# Patient Record
Sex: FEMALE | Race: OTHER | HISPANIC OR LATINO | Employment: PART TIME | URBAN - METROPOLITAN AREA
[De-identification: names, ages, dates, MRNs, and addresses within clinical notes are randomized per-mention and may not be internally consistent; named-entity substitution may affect disease eponyms.]

---

## 2018-04-08 ENCOUNTER — HOSPITAL ENCOUNTER (EMERGENCY)
Facility: HOSPITAL | Age: 23
Discharge: HOME | End: 2018-04-08
Admitting: EMERGENCY MEDICINE
Payer: COMMERCIAL

## 2018-04-08 VITALS
DIASTOLIC BLOOD PRESSURE: 65 MMHG | WEIGHT: 240.8 LBS | RESPIRATION RATE: 18 BRPM | SYSTOLIC BLOOD PRESSURE: 139 MMHG | TEMPERATURE: 98.6 F | OXYGEN SATURATION: 100 % | HEART RATE: 100 BPM

## 2018-04-08 DIAGNOSIS — H60.92 LEFT OTITIS EXTERNA: Primary | ICD-10-CM

## 2018-04-08 PROCEDURE — 99282 EMERGENCY DEPT VISIT SF MDM: CPT

## 2018-04-08 RX ORDER — ACETIC ACID 20.65 MG/ML
4 SOLUTION AURICULAR (OTIC) 3 TIMES DAILY
Qty: 15 ML | Refills: 0 | Status: SHIPPED | OUTPATIENT
Start: 2018-04-08 | End: 2019-09-24

## 2018-04-08 NOTE — ED PROVIDER NOTES
History  Chief Complaint   Patient presents with   [place] Spurr     Patient complains of L ear pain starting on Thursday  Denies fevers  Patient has Hx of swimmers ear and feels like same pain  Patient is a 26-year-old female who reports left ear feeling itchy and wet starting on Thursday  Since then developed left ear pain which has been steadily worsening  She has not tried anything for the pain as of yet  She does have a history of swimmer's ear and reports that symptoms are similar  She denies fevers, chills, runny nose, congestion, sore throat, cough  ROS:  Gen: no fevers, chills, fatigue  Eyes: no redness, pain, vision change, discharge  ENT: +left er pain, no runny nose, no congestion, no sinus pain, no sore throat  Heart: no chest pain, palpitations  Lungs: no cough, no chest tightness, no wheezing, no sob  GI: no abdominal pain, no nausea, no vomiting, no diarrhea  Gu: no dysuria  Skin: no rash  Neuro: no dizziness, headaches, weakness  Ms: no myalgias, no arthralgias     Physical:  Vitals reviewed  Gen: well nourished, cooperative, comfortable in no acute distress  Eyes: PERRL, EOM's intact, conjunctiva/sclera clear, no discharge  Ears: external ears normal, right EAC clear, right TM clear  Left pinna and tragus tender to palpation, left EAC hyperemic with mild edema and scaling, L TM clear  Nose: no active drainage, turbinates with no inflammation, sinuses NTTP  Mouth: mucous membranes moist, pos pharynx clear with no erythema, no edema, uvula is midline, no tonsillar hypertrophy, no exudates  Neck: supple, no masses, no lymphadenopathy, FROM intact  Heart: regular rate and rhythm, no murmurs  Lungs: clear bilat, no wheezes, rales, rhonchi, no tachypnea  Abd: soft, +BS, NTTP, no guarding, no rebound  Skin: good skin turgor, no rash  Musc: Non-focal with FROM BL upper/lower extremities, neck, chest and back  Neuro: Non-focal  No sensory deficits noted  No motor deficits noted  None       History reviewed  No pertinent past medical history  History reviewed  No pertinent surgical history  History reviewed  No pertinent family history  I have reviewed and agree with the history as documented  Social History   Substance Use Topics    Smoking status: Never Smoker    Smokeless tobacco: Never Used    Alcohol use No        Review of Systems    Physical Exam  ED Triage Vitals [04/08/18 1000]   Temperature Pulse Respirations Blood Pressure SpO2   98 6 °F (37 °C) 100 18 139/65 100 %      Temp Source Heart Rate Source Patient Position - Orthostatic VS BP Location FiO2 (%)   Oral Monitor Sitting Right arm --      Pain Score       8           Orthostatic Vital Signs  Vitals:    04/08/18 1000   BP: 139/65   Pulse: 100   Patient Position - Orthostatic VS: Sitting       Physical Exam    ED Medications  Medications - No data to display    Diagnostic Studies  Results Reviewed     None                 No orders to display              Procedures  Procedures       Phone Contacts  ED Phone Contact    ED Course  ED Course                                MDM  CritCare Time    Disposition  Final diagnoses:   Left otitis externa     Time reflects when diagnosis was documented in both MDM as applicable and the Disposition within this note     Time User Action Codes Description Comment    4/8/2018 10:13 AM Lili SCHWAB Add [H60 92] Left otitis externa       ED Disposition     ED Disposition Condition Comment    Discharge  [address] discharge to home/self care  Condition at discharge: Good        Follow-up Information     Follow up With Specialties Details Why Contact Info Additional Information    [facility] Urgent Care  For recheck in 5-7 days, sooner if symptoms change or worsen  214 Replaced by [facility]  [phone] Via the [address] [facility] (North[facility]) Take [address] toward [facility]   Take the [facility] [address]  [employer] [address]  Keep right and follow signs for [address]/ [place]  Merge onto [address] [place] [address]  In a half mile, take the exit for [address] toward [facility]  In 0 7 miles take the [facility] [employer]  Merge onto [facility]  In 500 feet, turn left on [address] and drive 0 3 miles  [address] will be on your left  [address] [place]  Take the [facility] [employer]  Merge onto [facility]  In 500 feet, turn left on [address] and [address] 0 [address] miles  [address] will be on your left  [address] [facility] towards [facility]  In 0 7 miles take the [facility] [employer]  Merge onto [facility]  In 500 feet, turn left on [address] and drive 0 3 miles  [address] will be on your left  Patient's Medications   Discharge Prescriptions    ACETIC ACID (VOSOL) 2 % OTIC SOLUTION    Administer 4 drops into the left ear 3 (three) times a day       Start Date: 4/8/2018  End Date: --       Order Dose: 4 drops       Quantity: 15 mL    Refills: 0     No discharge procedures on file      ED Provider  Electronically Signed by           Gabrielle Douglas PA-C  04/08/18 1016

## 2018-04-08 NOTE — DISCHARGE INSTRUCTIONS
Otitis Externa   WHAT YOU NEED TO KNOW:   Otitis externa, or swimmer's ear, is an infection in the outer ear canal  This canal goes from the outside of the ear to the eardrum  DISCHARGE INSTRUCTIONS:   Return to the emergency department if:   · You have severe ear pain  · You are suddenly unable to hear at all  · You have new swelling in your face, behind your ears, or in your neck  · You suddenly cannot move part of your face  · Your face suddenly feels numb  Contact your healthcare provider if:   · You have a fever  · Your signs and symptoms do not get better after 2 days of treatment  · Your signs and symptoms go away for a time, but then come back  · You have questions or concerns about your condition or care  Medicines:   · NSAIDs , such as ibuprofen, help decrease swelling, pain, and fever  This medicine is available with or without a doctor's order  NSAIDs can cause stomach bleeding or kidney problems in certain people  If you take blood thinner medicine, always ask if NSAIDs are safe for you  Always read the medicine label and follow directions  Do not give these medicines to children under 10months of age without direction from your child's healthcare provider  · Acetaminophen  decreases pain and fever  It is available without a doctor's order  Ask how much to take and how often to take it  Follow directions  Acetaminophen can cause liver damage if not taken correctly  · Ear drops  that contain an antibiotic may be given  The antibiotic helps treat a bacterial infection  You may also be given steroid medicine  The steroid helps decrease redness, swelling, and pain  · Take your medicine as directed  Contact your healthcare provider if you think your medicine is not helping or if you have side effects  Tell him or her if you are allergic to any medicine  Keep a list of the medicines, vitamins, and herbs you take   Include the amounts, and when and why you take them  Bring the list or the pill bottles to follow-up visits  Carry your medicine list with you in case of an emergency  Follow up with your healthcare provider as directed:  Write down your questions so you remember to ask them during your visits  How to use eardrops:   · Lie down on your side with your infected ear facing up  · Carefully drip the correct number of eardrops into your ear  Have another person help you if possible  · Gently move the outside part of your ear back and forth to help the medicine reach your ear canal      · Stay lying down in the same position (with your ear facing up) for 3 to 5 minutes  Prevent otitis externa:   · Do not put cotton swabs or foreign objects in your ears  · Wrap a clean moist washcloth around your finger, and use it to clean your outer ear and remove extra ear wax  · Use ear plugs when you swim  Dry your outer ears completely after you swim or bathe  © 2017 [facility] Information is for End User's use only and may not be sold, redistributed or otherwise used for commercial purposes  All illustrations and images included in CareNotes® are the copyrighted property of [employer]  or Jonathan Alexander  The above information is an  only  It is not intended as medical advice for individual conditions or treatments  Talk to your doctor, nurse or pharmacist before following any medical regimen to see if it is safe and effective for you

## 2018-04-08 NOTE — ED NOTES
Pt evaluated and discharged by provider prior to nursing assessment          Storm Barakat RN  04/08/18 1789

## 2018-04-12 ENCOUNTER — OFFICE VISIT (OUTPATIENT)
Dept: URGENT CARE | Facility: MEDICAL CENTER | Age: 23
End: 2018-04-12
Payer: COMMERCIAL

## 2018-04-12 VITALS
SYSTOLIC BLOOD PRESSURE: 110 MMHG | BODY MASS INDEX: 36.08 KG/M2 | DIASTOLIC BLOOD PRESSURE: 62 MMHG | TEMPERATURE: 97.3 F | WEIGHT: 243.6 LBS | HEIGHT: 69 IN | HEART RATE: 80 BPM | RESPIRATION RATE: 18 BRPM | OXYGEN SATURATION: 99 %

## 2018-04-12 DIAGNOSIS — H60.332 ACUTE SWIMMER'S EAR OF LEFT SIDE: Primary | ICD-10-CM

## 2018-04-12 PROCEDURE — G0382 LEV 3 HOSP TYPE B ED VISIT: HCPCS | Performed by: PHYSICIAN ASSISTANT

## 2018-04-12 PROCEDURE — 99283 EMERGENCY DEPT VISIT LOW MDM: CPT | Performed by: PHYSICIAN ASSISTANT

## 2018-04-12 NOTE — PROGRESS NOTES
[facility]        NAME: Ansel Cockayne is a 25 y o  female  : 1995    MRN: [id number]  DATE: 2018  TIME: 3:58 PM    Assessment and Plan   Acute swimmer's ear of left side [H60 332]  1  Acute swimmer's ear of left side           Patient Instructions       Continue taking ear drops as prescribed  Follow up with family doctor this week to confirm resolution  Go to ER if any new or worsening symptoms occur  Chief Complaint     Chief Complaint   Patient presents with    Ear Drainage      went to the hospital dx= otitis, given ear gtts then saw fmd tuesday, given ear gtts  pain is less today but hearing loss is worse  still using ear gtts  taking prn ibuprofen, LD 10:00 am          History of Present Illness       26 yo F was dx with otitis externa  and given acetic acid  She followed up with PCP on 4/10 and was given a different ear drop but patient doesn't know what that was  She has been taking that ear drop as prescribed and currently she has no pain  Patient states that she noticed white fluid in ear today so she wanted to be seen again  States symptoms appear to have completely resolved except for this white dc  No other sx        Review of Systems   Review of Systems   Constitutional: Negative for chills, diaphoresis, fatigue and fever  HENT: Positive for ear discharge  Negative for congestion, ear pain, postnasal drip, rhinorrhea, sinus pain, sinus pressure and voice change  Eyes: Negative  Respiratory: Negative for cough, chest tightness and shortness of breath  Cardiovascular: Negative for chest pain and palpitations  Gastrointestinal: Negative for constipation, diarrhea, nausea and vomiting  Endocrine: Negative  Genitourinary: Negative for dysuria  Musculoskeletal: Negative for back pain, myalgias and neck pain  Skin: Negative for pallor and rash  Allergic/Immunologic: Negative      Neurological: Negative for dizziness, syncope and headaches  Hematological: Negative  Psychiatric/Behavioral: Negative  Current Medications       Current Outpatient Prescriptions:     acetic acid (VOSOL) 2 % otic solution, Administer 4 drops into the left ear 3 (three) times a day, Disp: 15 mL, Rfl: 0    Current Allergies     Allergies as of 04/12/2018    (No Known Allergies)            The following portions of the patient's history were reviewed and updated as appropriate: allergies, current medications, past family history, past medical history, past social history, past surgical history and problem list      No past medical history on file  No past surgical history on file  No family history on file  Medications have been verified  Objective   /62 (BP Location: Left arm, Patient Position: Sitting)   Pulse 80   Temp (!) 97 3 °F (36 3 °C) (Tympanic)   Resp 18   Ht 5' 9" (1 753 m)   Wt 110 kg (243 lb 9 6 oz)   SpO2 99%   BMI 35 97 kg/m²        Physical Exam     Physical Exam   Constitutional: She is oriented to person, place, and time  She appears well-developed and well-nourished  No distress  HENT:   Head: Normocephalic and atraumatic  Right Ear: External ear normal    Left Ear: External ear normal    Nose: Nose normal    Mouth/Throat: Oropharynx is clear and moist  No oropharyngeal exudate  R ear completely normal   L ear no longer has pain with movement of tragus or pinna  TM intact and translucent  Large amount of white dc with mild edema of L ear canal consistent with otitis externa  Eyes: Conjunctivae and EOM are normal  Pupils are equal, round, and reactive to light  Neck: Normal range of motion  Neck supple  Cardiovascular: Normal rate, regular rhythm, normal heart sounds and intact distal pulses  Pulmonary/Chest: Effort normal and breath sounds normal  No respiratory distress  She has no wheezes  She has no rales  Musculoskeletal: She exhibits no edema or deformity     Neurological: She is alert and oriented to person, place, and time  Skin: Skin is warm  No rash noted  She is not diaphoretic  Nursing note and vitals reviewed

## 2018-04-12 NOTE — PATIENT INSTRUCTIONS
Continue taking ear drops as prescribed  Follow up with family doctor this week to confirm resolution  Go to ER if any new or worsening symptoms occur  Otitis Externa   WHAT YOU NEED TO KNOW:   Otitis externa, or swimmer's ear, is an infection in the outer ear canal  This canal goes from the outside of the ear to the eardrum  DISCHARGE INSTRUCTIONS:   Return to the emergency department if:   · You have severe ear pain  · You are suddenly unable to hear at all  · You have new swelling in your face, behind your ears, or in your neck  · You suddenly cannot move part of your face  · Your face suddenly feels numb  Contact your healthcare provider if:   · You have a fever  · Your signs and symptoms do not get better after 2 days of treatment  · Your signs and symptoms go away for a time, but then come back  · You have questions or concerns about your condition or care  Medicines:   · NSAIDs , such as ibuprofen, help decrease swelling, pain, and fever  This medicine is available with or without a doctor's order  NSAIDs can cause stomach bleeding or kidney problems in certain people  If you take blood thinner medicine, always ask if NSAIDs are safe for you  Always read the medicine label and follow directions  Do not give these medicines to children under 10months of age without direction from your child's healthcare provider  · Acetaminophen  decreases pain and fever  It is available without a doctor's order  Ask how much to take and how often to take it  Follow directions  Acetaminophen can cause liver damage if not taken correctly  · Ear drops  that contain an antibiotic may be given  The antibiotic helps treat a bacterial infection  You may also be given steroid medicine  The steroid helps decrease redness, swelling, and pain  · Take your medicine as directed  Contact your healthcare provider if you think your medicine is not helping or if you have side effects   Tell him or her if you are allergic to any medicine  Keep a list of the medicines, vitamins, and herbs you take  Include the amounts, and when and why you take them  Bring the list or the pill bottles to follow-up visits  Carry your medicine list with you in case of an emergency  Follow up with your healthcare provider as directed:  Write down your questions so you remember to ask them during your visits  How to use eardrops:   · Lie down on your side with your infected ear facing up  · Carefully drip the correct number of eardrops into your ear  Have another person help you if possible  · Gently move the outside part of your ear back and forth to help the medicine reach your ear canal      · Stay lying down in the same position (with your ear facing up) for 3 to 5 minutes  Prevent otitis externa:   · Do not put cotton swabs or foreign objects in your ears  · Wrap a clean moist washcloth around your finger, and use it to clean your outer ear and remove extra ear wax  · Use ear plugs when you swim  Dry your outer ears completely after you swim or bathe  © 2017 [address] [employer]  Information is for End User's use only and may not be sold, redistributed or otherwise used for commercial purposes  All illustrations and images included in CareNotes® are the copyrighted property of [employer]  or Jonathan Alexander  The above information is an  only  It is not intended as medical advice for individual conditions or treatments  Talk to your doctor, nurse or pharmacist before following any medical regimen to see if it is safe and effective for you

## 2019-09-24 ENCOUNTER — HOSPITAL ENCOUNTER (EMERGENCY)
Facility: HOSPITAL | Age: 24
Discharge: HOME | End: 2019-09-24
Attending: EMERGENCY MEDICINE | Admitting: EMERGENCY MEDICINE
Payer: COMMERCIAL

## 2019-09-24 VITALS
BODY MASS INDEX: 29.53 KG/M2 | OXYGEN SATURATION: 100 % | WEIGHT: 200 LBS | RESPIRATION RATE: 16 BRPM | HEART RATE: 89 BPM | TEMPERATURE: 98.4 F | SYSTOLIC BLOOD PRESSURE: 134 MMHG | DIASTOLIC BLOOD PRESSURE: 65 MMHG

## 2019-09-24 DIAGNOSIS — L24.81 IRRITANT CONTACT DERMATITIS DUE TO METALS: Primary | ICD-10-CM

## 2019-09-24 PROCEDURE — 99282 EMERGENCY DEPT VISIT SF MDM: CPT

## 2019-09-24 PROCEDURE — 99282 EMERGENCY DEPT VISIT SF MDM: CPT | Performed by: NURSE PRACTITIONER

## 2019-09-24 RX ORDER — PHENTERMINE HYDROCHLORIDE 37.5 MG/1
37.5 CAPSULE ORAL EVERY MORNING
COMMUNITY

## 2019-09-24 RX ORDER — FAMOTIDINE 20 MG/1
10 TABLET, FILM COATED ORAL ONCE
Status: COMPLETED | OUTPATIENT
Start: 2019-09-24 | End: 2019-09-24

## 2019-09-24 RX ORDER — TOPIRAMATE 25 MG/1
25 TABLET ORAL DAILY
COMMUNITY

## 2019-09-24 RX ORDER — FAMOTIDINE 10 MG
10 TABLET ORAL 2 TIMES DAILY
Qty: 10 TABLET | Refills: 0 | Status: SHIPPED | OUTPATIENT
Start: 2019-09-25 | End: 2019-12-03 | Stop reason: ALTCHOICE

## 2019-09-24 RX ORDER — PREDNISONE 10 MG/1
TABLET ORAL
Qty: 15 TABLET | Refills: 0 | Status: SHIPPED | OUTPATIENT
Start: 2019-09-25

## 2019-09-24 RX ADMIN — PREDNISONE 50 MG: 10 TABLET ORAL at 04:55

## 2019-09-24 RX ADMIN — FAMOTIDINE 10 MG: 20 TABLET ORAL at 04:51

## 2019-09-24 NOTE — ED PROVIDER NOTES
History  Chief Complaint   Patient presents with    Rash     Patient presents complaining of rash to neck, has since gone to chest and upper back  Itchy  No drainage  No ill contacts  No new exposures  This is a 25year old female who states started Sunday with a rash around her neck and has now extended to her upper chest  She states it is itchy  She states she was wearing new jewelery  Denies soap, detergent changes  She denies taking anything for the rash  LMP - spotting now and has mirena       History provided by:  Patient and medical records   used: No    Rash   Location:  Head/neck  Head/neck rash location:  L neck and R neck  Quality: itchiness    Severity:  Mild  Onset quality:  Gradual  Progression:  Worsening  Chronicity:  New  Relieved by:  None tried  Ineffective treatments:  None tried      Prior to Admission Medications   Prescriptions Last Dose Informant Patient Reported? Taking? phentermine 37 5 MG capsule   Yes Yes   Sig: Take 37 5 mg by mouth every morning   topiramate (TOPAMAX) 25 mg tablet   Yes Yes   Sig: Take 25 mg by mouth daily      Facility-Administered Medications: None       History reviewed  No pertinent past medical history  History reviewed  No pertinent surgical history  History reviewed  No pertinent family history  I have reviewed and agree with the history as documented  Social History     Tobacco Use    Smoking status: Never Smoker    Smokeless tobacco: Never Used   Substance Use Topics    Alcohol use: No    Drug use: No        Review of Systems   Skin: Positive for rash  All other systems reviewed and are negative  Physical Exam  Physical Exam   Constitutional: She is oriented to person, place, and time  She appears well-developed and well-nourished  No distress  HENT:   Head: Normocephalic and atraumatic  Eyes: Pupils are equal, round, and reactive to light  EOM are normal    Neck: Normal range of motion     Cardiovascular: Normal rate, regular rhythm and normal heart sounds  Pulmonary/Chest: Effort normal and breath sounds normal    Abdominal: Soft  Bowel sounds are normal    Musculoskeletal: Normal range of motion  Neurological: She is alert and oriented to person, place, and time  Skin: Skin is warm and dry  Capillary refill takes less than 2 seconds  She is not diaphoretic  Pt has a faint red pimple like rash to neck, upper chest      Psychiatric: She has a normal mood and affect  Her behavior is normal  Judgment and thought content normal    Nursing note and vitals reviewed        Vital Signs  ED Triage Vitals   Temperature Pulse Respirations Blood Pressure SpO2   09/24/19 0431 09/24/19 0427 09/24/19 0427 09/24/19 0427 09/24/19 0427   98 4 °F (36 9 °C) 89 16 134/65 100 %      Temp src Heart Rate Source Patient Position - Orthostatic VS BP Location FiO2 (%)   -- 09/24/19 0427 09/24/19 0427 09/24/19 0427 --    Monitor Sitting Right arm       Pain Score       --                  Vitals:    09/24/19 0427   BP: 134/65   Pulse: 89   Patient Position - Orthostatic VS: Sitting         Visual Acuity      ED Medications  Medications   predniSONE tablet 50 mg (50 mg Oral Given 9/24/19 0455)   famotidine (PEPCID) tablet 10 mg (10 mg Oral Given 9/24/19 0451)       Diagnostic Studies  Results Reviewed     None                 No orders to display              Procedures  Procedures       ED Course                               MDM  Number of Diagnoses or Management Options  Irritant contact dermatitis due to metals:   Diagnosis management comments: Rash - possible allergic vs irritant reaction to jewelry wearing      Prednisone  pepcid  May use OTC anti itch cream  Follow up with PCP  Pt verbalizes understanding of dc instructions and follow up        Amount and/or Complexity of Data Reviewed  Review and summarize past medical records: yes        Disposition  Final diagnoses:   Irritant contact dermatitis due to metals     Time reflects when diagnosis was documented in both MDM as applicable and the Disposition within this note     Time User Action Codes Description Comment    9/24/2019  4:47 AM Beny Rene Add [L24 81] Irritant contact dermatitis due to metals       ED Disposition     ED Disposition Condition Date/Time Comment    Discharge Stable Tue Sep 24, 2019  4:47 AM Mary Salas discharge to home/self care  Follow-up Information     Follow up With Specialties Details Why Contact Info Additional Information    Joe Bolanos DO Family Medicine Schedule an appointment as soon as possible for a visit in 2 days  [address] Emergency Department Emergency Medicine  If symptoms worsen [facility] [id number]  [facility] ED, [address] [place], [place], [address]          Patient's Medications   Discharge Prescriptions    FAMOTIDINE (PEPCID) 10 MG TABLET    Take 1 tablet (10 mg total) by mouth 2 (two) times a day for 5 days       Start Date: 9/25/2019 End Date: 9/30/2019       Order Dose: 10 mg       Quantity: 10 tablet    Refills: 0    PREDNISONE 10 MG TABLET    Take 5 tabs po x 1 day; 4 tabs po x 1 day; 3 tabs po x 1 day; 2 tabs po x 1 day; 1 tab po x 1 day  Start Date: 9/25/2019 End Date: --       Order Dose: --       Quantity: 15 tablet    Refills: 0     No discharge procedures on file      ED Provider  Electronically Signed by           Roxane Cuevas  09/24/19 8262

## 2019-12-03 ENCOUNTER — HOSPITAL ENCOUNTER (EMERGENCY)
Facility: HOSPITAL | Age: 24
Discharge: HOME | End: 2019-12-03
Attending: EMERGENCY MEDICINE | Admitting: EMERGENCY MEDICINE
Payer: COMMERCIAL

## 2019-12-03 VITALS
BODY MASS INDEX: 27.54 KG/M2 | WEIGHT: 186.51 LBS | SYSTOLIC BLOOD PRESSURE: 123 MMHG | HEART RATE: 74 BPM | OXYGEN SATURATION: 100 % | TEMPERATURE: 98.5 F | RESPIRATION RATE: 16 BRPM | DIASTOLIC BLOOD PRESSURE: 76 MMHG

## 2019-12-03 DIAGNOSIS — R11.0 NAUSEA: Primary | ICD-10-CM

## 2019-12-03 DIAGNOSIS — R20.2 RIGHT LEG PARESTHESIAS: ICD-10-CM

## 2019-12-03 DIAGNOSIS — K29.70 GASTRITIS: ICD-10-CM

## 2019-12-03 LAB
D DIMER PPP FEU-MCNC: 0.27 UG/ML FEU
EXT PREG TEST URINE: NEGATIVE
EXT. CONTROL ED NAV: NORMAL

## 2019-12-03 PROCEDURE — 36415 COLL VENOUS BLD VENIPUNCTURE: CPT | Performed by: EMERGENCY MEDICINE

## 2019-12-03 PROCEDURE — 99283 EMERGENCY DEPT VISIT LOW MDM: CPT | Performed by: EMERGENCY MEDICINE

## 2019-12-03 PROCEDURE — 85379 FIBRIN DEGRADATION QUANT: CPT | Performed by: EMERGENCY MEDICINE

## 2019-12-03 PROCEDURE — 99283 EMERGENCY DEPT VISIT LOW MDM: CPT

## 2019-12-03 PROCEDURE — 81025 URINE PREGNANCY TEST: CPT | Performed by: EMERGENCY MEDICINE

## 2019-12-03 RX ORDER — FAMOTIDINE 20 MG/1
20 TABLET, FILM COATED ORAL 2 TIMES DAILY
Qty: 28 TABLET | Refills: 0 | Status: SHIPPED | OUTPATIENT
Start: 2019-12-03

## 2019-12-03 RX ORDER — ONDANSETRON 4 MG/1
4 TABLET, ORALLY DISINTEGRATING ORAL EVERY 6 HOURS PRN
Qty: 8 TABLET | Refills: 0 | Status: SHIPPED | OUTPATIENT
Start: 2019-12-03 | End: 2021-12-30

## 2019-12-04 NOTE — DISCHARGE INSTRUCTIONS
Gastritis   WHAT YOU NEED TO KNOW:   Gastritis is inflammation or irritation of the lining of your stomach  DISCHARGE INSTRUCTIONS:   Call 911 for any of the following: You develop chest pain or shortness of breath  Return to the emergency department if:   You vomit blood  You have black or bloody bowel movements  You have severe stomach or back pain  Contact your healthcare provider if:   You have a fever  You have new or worsening symptoms, even after treatment  You have questions or concerns about your condition or care  Medicines:     Take your medicine as directed  Manage or prevent gastritis:   Do not smoke  Nicotine and other chemicals in cigarettes and cigars can make your symptoms worse and cause lung damage  Ask your healthcare provider for information if you currently smoke and need help to quit  E-cigarettes or smokeless tobacco still contain nicotine  Talk to your healthcare provider before you use these products  Do not drink alcohol  Alcohol can prevent healing and make your gastritis worse  Talk to your healthcare provider if you need help to stop drinking  Do not take NSAIDs or aspirin unless directed  These and similar medicines can cause irritation  If your healthcare provider says it is okay to take NSAIDs, take them with food  Do not eat foods that cause irritation  Foods such as oranges and salsa can cause burning or pain  Eat a variety of healthy foods  Examples include fruits (not citrus), vegetables, low-fat dairy products, beans, whole-grain breads, and lean meats and fish  Try to eat small meals, and drink water with your meals  Do not eat for at least 3 hours before you go to bed

## 2019-12-04 NOTE — ED PROVIDER NOTES
History  Chief Complaint   Patient presents with    Nausea     Patient presents complaining of "nausea" for a few days, "upset stomach", "acid taste" in mouth, took pregnancy test at home but it was "invalid"  Also reports headache and "numbness from my right knee down"  24 yo female c/o onset of nausea about 1 week ago, associated with "acid" taste and indigestion, exacerbated by eating  She has not vomited  She uses Mirena but did check a pregnancy test, because she recalls similar nausea when she was pregnant previously, but could not interpret it  She also c/o sensation of tingling below her right knee, "like it's asleep "  It's fairly constant throughout the day  She works in customer service, 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 Taking?   levonorgestrel (MIRENA) 20 MCG/24HR IUD   Yes Yes   Si each by Intrauterine route   phentermine 37 5 MG capsule Not Taking at Unknown time  Yes No   Sig: Take 37 5 mg by mouth every morning   predniSONE 10 mg tablet Not Taking at Unknown time  No No   Sig: Take 5 tabs po x 1 day; 4 tabs po x 1 day; 3 tabs po x 1 day; 2 tabs po x 1 day; 1 tab po x 1 day  Patient not taking: Reported on 12/3/2019   topiramate (TOPAMAX) 25 mg tablet Not Taking at Unknown time  Yes No   Sig: Take 25 mg by mouth daily      Facility-Administered Medications: None       History reviewed  No pertinent past medical history  History reviewed  No pertinent surgical history  History reviewed  No pertinent family history    I have reviewed and agree with the history as documented  Social History     Tobacco Use    Smoking status: Never Smoker    Smokeless tobacco: Never Used   Substance Use Topics    Alcohol use: No    Drug use: No        Review of Systems   Constitutional: Negative for appetite change, chills and fever  HENT: Negative for sore throat  Respiratory: Negative for cough, shortness of breath and wheezing  Cardiovascular: Negative for chest pain and palpitations  Gastrointestinal: Positive for nausea  Negative for abdominal pain, diarrhea and vomiting  Genitourinary: Negative for dysuria and hematuria  Musculoskeletal: Negative for neck pain  Skin: Negative for rash  Neurological: Negative for dizziness, weakness and headaches  Psychiatric/Behavioral: Negative for suicidal ideas  All other systems reviewed and are negative  Physical Exam  Physical Exam   Constitutional: She is oriented to person, place, and time  Vital signs are normal  She appears well-developed and well-nourished  Non-toxic appearance  HENT:   Head: Normocephalic and atraumatic  Right Ear: Tympanic membrane and external ear normal    Left Ear: Tympanic membrane and external ear normal    Nose: Nose normal    Mouth/Throat: Oropharynx is clear and moist    Eyes: Pupils are equal, round, and reactive to light  Conjunctivae and EOM are normal    Neck: Normal range of motion and full passive range of motion without pain  Neck supple  No Brudzinski's sign and no Kernig's sign noted  Cardiovascular: Normal rate, regular rhythm, normal heart sounds and intact distal pulses  No murmur heard  Pulses:       Dorsalis pedis pulses are 3+ on the right side, and 3+ on the left side  Posterior tibial pulses are 3+ on the right side, and 3+ on the left side  Pulmonary/Chest: Effort normal and breath sounds normal  No tachypnea  No respiratory distress  She has no wheezes  Abdominal: Soft  Bowel sounds are normal  She exhibits no distension   There is no tenderness  There is no rigidity, no rebound and no guarding  Musculoskeletal: Normal range of motion  Right lower leg: She exhibits no swelling  Left lower leg: She exhibits no swelling  Lymphadenopathy:     She has no cervical adenopathy  Neurological: She is alert and oriented to person, place, and time  She has normal strength and normal reflexes  No cranial nerve deficit or sensory deficit  Coordination and gait normal  GCS eye subscore is 4  GCS verbal subscore is 5  GCS motor subscore is 6  Skin: Skin is warm and dry  Capillary refill takes less than 2 seconds  No rash noted  She is not diaphoretic  No pallor  Psychiatric: She has a normal mood and affect  Her speech is normal and behavior is normal  Judgment and thought content normal  Cognition and memory are normal    Nursing note and vitals reviewed        Vital Signs  ED Triage Vitals [12/03/19 2038]   Temperature Pulse Respirations Blood Pressure SpO2   98 5 °F (36 9 °C) 71 16 154/80 100 %      Temp Source Heart Rate Source Patient Position - Orthostatic VS BP Location FiO2 (%)   Temporal Monitor Sitting Right arm --      Pain Score       4           Vitals:    12/03/19 2038 12/03/19 2250   BP: 154/80 123/76   Pulse: 71 74   Patient Position - Orthostatic VS: Sitting Sitting         Visual Acuity  Visual Acuity      Most Recent Value   L Pupil Size (mm)  3   R Pupil Size (mm)  3          ED Medications  Medications - No data to display    Diagnostic Studies  Results Reviewed     Procedure Component Value Units Date/Time    D-dimer, quantitative [[id number]]  (Normal) Collected:  12/03/19 2257    Lab Status:  Final result Specimen:  Blood from Arm, Right Updated:  12/03/19 2315     D-Dimer, Quant 0 27 ug/ml FEU     POCT pregnancy, urine [[id number]]  (Normal) Resulted:  12/03/19 2254    Lab Status:  Final result Updated:  12/03/19 2254     EXT PREG TEST UR (Ref: Negative) negative     Control valid                 No orders to display Procedures  Procedures         ED Course  ED Course as of Dec 04 0001   Tue Dec 03, 2019   2329 normal   D-Dimer, Quant: 0 27                               MDM      Disposition  Final diagnoses:   Nausea   Gastritis   Right leg paresthesias     Time reflects when diagnosis was documented in both MDM as applicable and the Disposition within this note     Time User Action Codes Description Comment    12/3/2019 11:34 PM Joanna Chaidez Add [R11 0] Nausea     12/3/2019 11:34 PM Carley Ferraris L Add [K29 70] Gastritis     12/3/2019 11:34 PM Joanna Chaidez Add [R20 2] Right leg paresthesias       ED Disposition     ED Disposition Condition Date/Time Comment    Discharge Good Tue Dec 3, 2019 11:34 PM Willis Rosa Maria discharge to home/self care  Follow-up Information     Follow up With Specialties Details Why Cheryl Childress MD Family Medicine Schedule an appointment as soon as possible for a visit  For followup  [address]   [address]  [phone]            Discharge Medication List as of 12/3/2019 11:36 PM      START taking these medications    Details   famotidine (PEPCID) 20 mg tablet Take 1 tablet (20 mg total) by mouth 2 (two) times a day, Starting Tue 12/3/2019, Print      ondansetron (ZOFRAN-ODT) 4 mg disintegrating tablet Take 1 tablet (4 mg total) by mouth every 6 (six) hours as needed for nausea or vomiting, Starting Tue 12/3/2019, Print         CONTINUE these medications which have NOT CHANGED    Details   levonorgestrel (MIRENA) 20 MCG/24HR IUD 1 each by Intrauterine route, Historical Med      phentermine 37 5 MG capsule Take 37 5 mg by mouth every morning, Historical Med      predniSONE 10 mg tablet Take 5 tabs po x 1 day; 4 tabs po x 1 day; 3 tabs po x 1 day; 2 tabs po x 1 day; 1 tab po x 1 day , Print      topiramate (TOPAMAX) 25 mg tablet Take 25 mg by mouth daily, Historical Med           No discharge procedures on file     ED Provider  Electronically Signed by           Thao Brian MD  12/03/19 243 Thai Babin MD  12/04/19 0001

## 2021-12-30 ENCOUNTER — HOSPITAL ENCOUNTER (EMERGENCY)
Facility: HOSPITAL | Age: 26
Discharge: HOME | End: 2021-12-30
Attending: EMERGENCY MEDICINE
Payer: COMMERCIAL

## 2021-12-30 VITALS
RESPIRATION RATE: 18 BRPM | TEMPERATURE: 98.4 F | SYSTOLIC BLOOD PRESSURE: 151 MMHG | HEART RATE: 91 BPM | DIASTOLIC BLOOD PRESSURE: 84 MMHG | BODY MASS INDEX: 38.74 KG/M2 | OXYGEN SATURATION: 96 % | WEIGHT: 262.35 LBS

## 2021-12-30 DIAGNOSIS — R11.2 NAUSEA AND VOMITING: Primary | ICD-10-CM

## 2021-12-30 LAB
BACTERIA UR QL AUTO: ABNORMAL /HPF
BILIRUB UR QL STRIP: NEGATIVE
CLARITY UR: CLEAR
COLOR UR: YELLOW
EXT PREG TEST URINE: NEGATIVE
EXT. CONTROL ED NAV: NORMAL
GLUCOSE UR STRIP-MCNC: NEGATIVE MG/DL
HGB UR QL STRIP.AUTO: NEGATIVE
KETONES UR STRIP-MCNC: NEGATIVE MG/DL
LEUKOCYTE ESTERASE UR QL STRIP: NEGATIVE
MUCOUS THREADS UR QL AUTO: ABNORMAL
NITRITE UR QL STRIP: NEGATIVE
NON-SQ EPI CELLS URNS QL MICRO: ABNORMAL /HPF
PH UR STRIP.AUTO: 8.5 [PH] (ref 4.5–8)
PROT UR STRIP-MCNC: ABNORMAL MG/DL
RBC #/AREA URNS AUTO: ABNORMAL /HPF
SP GR UR STRIP.AUTO: 1.02 (ref 1–1.03)
UROBILINOGEN UR QL STRIP.AUTO: 0.2 E.U./DL
WBC #/AREA URNS AUTO: ABNORMAL /HPF

## 2021-12-30 PROCEDURE — 99283 EMERGENCY DEPT VISIT LOW MDM: CPT

## 2021-12-30 PROCEDURE — 81025 URINE PREGNANCY TEST: CPT | Performed by: PHYSICIAN ASSISTANT

## 2021-12-30 PROCEDURE — 99284 EMERGENCY DEPT VISIT MOD MDM: CPT | Performed by: PHYSICIAN ASSISTANT

## 2021-12-30 PROCEDURE — 81001 URINALYSIS AUTO W/SCOPE: CPT

## 2021-12-30 RX ORDER — ONDANSETRON 4 MG/1
4 TABLET, ORALLY DISINTEGRATING ORAL ONCE
Status: COMPLETED | OUTPATIENT
Start: 2021-12-30 | End: 2021-12-30

## 2021-12-30 RX ORDER — ONDANSETRON 4 MG/1
4 TABLET, ORALLY DISINTEGRATING ORAL EVERY 6 HOURS PRN
Qty: 20 TABLET | Refills: 0 | Status: SHIPPED | OUTPATIENT
Start: 2021-12-30

## 2021-12-30 RX ORDER — FLUOXETINE 10 MG/1
10 CAPSULE ORAL DAILY
COMMUNITY
Start: 2021-08-03

## 2021-12-30 RX ADMIN — ONDANSETRON 4 MG: 4 TABLET, ORALLY DISINTEGRATING ORAL at 07:03

## 2021-12-30 NOTE — Clinical Note
Jayla Krishnamurthy was seen and treated in our emergency department on 12/30/2021  Diagnosis:     Scarly    She may return on this date: 01/02/2022         If you have any questions or concerns, please don't hesitate to call        Ofelia Rivera PA-C    ______________________________           _______________          _______________  Hospital Representative                              Date                                Time

## 2021-12-30 NOTE — ED PROVIDER NOTES
History  Chief Complaint   Patient presents with    Vomiting     Multiple episodes of vomiting starting last night  Denies abdominal pain  Gibran Michaels is a 33 yo F presenting with nausea and numerous episodes of vomiting over the past day  She reports emesis is nonbloody, nonbilious  She denies associated abdominal pain, diarrhea, or constipation  Denies fevers/chills  Denies known sick contacts or suspected food intake  She does report several prior episodes of similar  Reports only one alcoholic beverage yesterday evening  She admits to long term daily marijuana use  History provided by:  Patient   used: No    Vomiting  Duration:  1 day  Timing:  Constant  Emesis appearance: nonbloody nonbilious  Progression:  Unchanged  Chronicity:  Recurrent  Relieved by:  None tried  Exacerbated by: eating  Associated symptoms: no abdominal pain, no arthralgias, no chills, no cough, no diarrhea, no fever, no headaches, no myalgias, no sore throat and no URI    Risk factors: no sick contacts, no suspect food intake and no travel to endemic areas        Prior to Admission Medications   Prescriptions Last Dose Informant Patient Reported? Taking? FLUoxetine (PROzac) 10 mg capsule   Yes Yes   Sig: Take 10 mg by mouth daily   famotidine (PEPCID) 20 mg tablet   No No   Sig: Take 1 tablet (20 mg total) by mouth 2 (two) times a day   levonorgestrel (MIRENA) 20 MCG/24HR IUD   Yes No   Si each by Intrauterine route   phentermine 37 5 MG capsule   Yes No   Sig: Take 37 5 mg by mouth every morning   predniSONE 10 mg tablet   No No   Sig: Take 5 tabs po x 1 day; 4 tabs po x 1 day; 3 tabs po x 1 day; 2 tabs po x 1 day; 1 tab po x 1 day  Patient not taking: Reported on 12/3/2019   topiramate (TOPAMAX) 25 mg tablet   Yes No   Sig: Take 25 mg by mouth daily      Facility-Administered Medications: None       History reviewed  No pertinent past medical history  History reviewed   No pertinent surgical history  History reviewed  No pertinent family history  I have reviewed and agree with the history as documented  E-Cigarette/Vaping     E-Cigarette/Vaping Substances     Social History     Tobacco Use    Smoking status: Never Smoker    Smokeless tobacco: Never Used   Substance Use Topics    Alcohol use: No    Drug use: No       Review of Systems   Constitutional: Negative for chills and fever  HENT: Negative for congestion, rhinorrhea and sore throat  Eyes: Negative for pain and visual disturbance  Respiratory: Negative for cough, shortness of breath and wheezing  Cardiovascular: Negative for chest pain and palpitations  Gastrointestinal: Positive for nausea and vomiting  Negative for abdominal pain, blood in stool, constipation and diarrhea  Genitourinary: Negative for dysuria, frequency and urgency  Musculoskeletal: Negative for arthralgias, back pain, myalgias, neck pain and neck stiffness  Skin: Negative for rash and wound  Neurological: Negative for dizziness, weakness, light-headedness, numbness and headaches  Physical Exam  Physical Exam  Constitutional:       General: She is not in acute distress  Appearance: She is well-developed  She is not diaphoretic  HENT:      Head: Normocephalic and atraumatic  Right Ear: External ear normal       Left Ear: External ear normal    Eyes:      Conjunctiva/sclera: Conjunctivae normal       Pupils: Pupils are equal, round, and reactive to light  Cardiovascular:      Rate and Rhythm: Normal rate and regular rhythm  Heart sounds: Normal heart sounds  No murmur heard  No friction rub  No gallop  Pulmonary:      Effort: Pulmonary effort is normal  No respiratory distress  Breath sounds: Normal breath sounds  No wheezing  Abdominal:      General: There is no distension  Palpations: Abdomen is soft  Tenderness: There is no abdominal tenderness   There is no right CVA tenderness, left CVA tenderness or guarding  Musculoskeletal:      Cervical back: Normal range of motion and neck supple  Lymphadenopathy:      Cervical: No cervical adenopathy  Skin:     General: Skin is warm and dry  Capillary Refill: Capillary refill takes less than 2 seconds  Findings: No erythema or rash  Neurological:      Mental Status: She is alert and oriented to person, place, and time  Motor: No abnormal muscle tone  Coordination: Coordination normal    Psychiatric:         Behavior: Behavior normal          Thought Content:  Thought content normal          Judgment: Judgment normal          Vital Signs  ED Triage Vitals [12/30/21 0618]   Temperature Pulse Respirations Blood Pressure SpO2   98 4 °F (36 9 °C) 91 18 151/84 96 %      Temp Source Heart Rate Source Patient Position - Orthostatic VS BP Location FiO2 (%)   Oral Monitor Sitting Right arm --      Pain Score       No Pain           Vitals:    12/30/21 0618   BP: 151/84   Pulse: 91   Patient Position - Orthostatic VS: Sitting         Visual Acuity      ED Medications  Medications   ondansetron (ZOFRAN-ODT) dispersible tablet 4 mg (4 mg Oral Given 12/30/21 0703)       Diagnostic Studies  Results Reviewed     Procedure Component Value Units Date/Time    Urine Microscopic [[id number]]  (Abnormal) Collected: 12/30/21 0807    Lab Status: Final result Specimen: Urine, Clean Catch Updated: 12/30/21 0846     RBC, UA None Seen /hpf      WBC, UA 4-10 /hpf      Epithelial Cells Occasional /hpf      Bacteria, UA Occasional /hpf      MUCUS THREADS Occasional    POCT pregnancy, urine [[id number]]  (Normal) Resulted: 12/30/21 0810    Lab Status: Final result Updated: 12/30/21 0810     EXT PREG TEST UR (Ref: Negative) NEGATIVE     Control VALID    Urine Macroscopic, POC [[id number]]  (Abnormal) Collected: 12/30/21 0807    Lab Status: Final result Specimen: Urine Updated: 12/30/21 0809     Color, UA Yellow     Clarity, UA Clear     pH, UA 8 5     Leukocytes, UA Negative Nitrite, UA Negative     Protein, UA 30 (1+) mg/dl      Glucose, UA Negative mg/dl      Ketones, UA Negative mg/dl      Urobilinogen, UA 0 2 E U /dl      Bilirubin, UA Negative     Blood, UA Negative     Specific Gravity, UA 1 020    Narrative:      CLINITEK RESULT                 No orders to display              Procedures  Procedures         ED Course                                             MDM  Number of Diagnoses or Management Options  Nausea and vomiting  Diagnosis management comments: Nausea, vomiting over the past day  No associated abdominal pain, diarrhea, fevers/chills  Physical exam is benign  Symptoms possibly secondary to marijuana hyperemesis given recurrent episodes and frequent marijuana use vs infectious gastroenteritis  Will check urine dip for ketones, urine preg, provide Zofran, PO challenge  Disposition pending successful PO challenge, urine  Amount and/or Complexity of Data Reviewed  Clinical lab tests: ordered and reviewed    Patient Progress  Patient progress: improved      Disposition  Final diagnoses:   Nausea and vomiting     Time reflects when diagnosis was documented in both MDM as applicable and the Disposition within this note     Time User Action Codes Description Comment    12/30/2021  8:43 AM Griselda  Add [R11 2] Nausea and vomiting       ED Disposition     ED Disposition Condition Date/Time Comment    Discharge Stable Thu Dec 30, 2021  8:43 AM Yulia Kramer discharge to home/self care              Follow-up Information     Follow up With Specialties Details Why Contact Info Additional [phone] Brenton Cannon MD Family Medicine Schedule an appointment as soon as possible for a visit   [address] [facility] [phone] 7[address] [facility] Emergency Medicine  If symptoms worsen [address] [facility] [phone] 757 [facility] Emergency Department, [address] , [place], [place], [address]          Discharge Medication List as of 12/30/2021  8:44 AM      START taking these medications    Details   ondansetron (Zofran ODT) 4 mg disintegrating tablet Take 1 tablet (4 mg total) by mouth every 6 (six) hours as needed for nausea or vomiting, Starting Thu 12/30/2021, Normal         CONTINUE these medications which have NOT CHANGED    Details   FLUoxetine (PROzac) 10 mg capsule Take 10 mg by mouth daily, Starting Tue 8/3/2021, Historical Med      famotidine (PEPCID) 20 mg tablet Take 1 tablet (20 mg total) by mouth 2 (two) times a day, Starting Tue 12/3/2019, Print      levonorgestrel (MIRENA) 20 MCG/24HR IUD 1 each by Intrauterine route, Historical Med      phentermine 37 5 MG capsule Take 37 5 mg by mouth every morning, Historical Med      predniSONE 10 mg tablet Take 5 tabs po x 1 day; 4 tabs po x 1 day; 3 tabs po x 1 day; 2 tabs po x 1 day; 1 tab po x 1 day , Print      topiramate (TOPAMAX) 25 mg tablet Take 25 mg by mouth daily, Historical Med             No discharge procedures on file      PDMP Review     None          ED Provider  Electronically Signed by           Tian Pink PA-C  12/30/21 8858

## 2021-12-30 NOTE — DISCHARGE INSTRUCTIONS
Please refer to the attached information for strict return instructions  If symptoms worsen or new symptoms develop please return to the ER  Please follow up with your primary care physician for re-evaluation of symptoms  Drink plenty of fluids to stay hydrated

## 2023-03-28 ENCOUNTER — HOSPITAL ENCOUNTER (EMERGENCY)
Facility: HOSPITAL | Age: 28
Discharge: HOME | End: 2023-03-29
Attending: EMERGENCY MEDICINE

## 2023-03-28 DIAGNOSIS — T14.8XXA BRUISING: ICD-10-CM

## 2023-03-28 DIAGNOSIS — M54.81 OCCIPITAL NEURALGIA: Primary | ICD-10-CM

## 2023-03-28 DIAGNOSIS — E87.6 HYPOKALEMIA: ICD-10-CM

## 2023-03-28 DIAGNOSIS — R00.2 PALPITATIONS: ICD-10-CM

## 2023-03-28 LAB
ALBUMIN SERPL BCP-MCNC: 3.8 G/DL (ref 3.5–5)
ALP SERPL-CCNC: 59 U/L (ref 34–104)
ALT SERPL W P-5'-P-CCNC: 16 U/L (ref 7–52)
ANION GAP SERPL CALCULATED.3IONS-SCNC: 7 MMOL/L (ref 4–13)
APTT PPP: 25 SECONDS (ref 23–37)
AST SERPL W P-5'-P-CCNC: 14 U/L (ref 13–39)
BASOPHILS # BLD AUTO: 0.06 THOUSANDS/ÂΜL (ref 0–0.1)
BASOPHILS NFR BLD AUTO: 1 % (ref 0–1)
BILIRUB SERPL-MCNC: 0.5 MG/DL (ref 0.2–1)
BUN SERPL-MCNC: 6 MG/DL (ref 5–25)
CALCIUM SERPL-MCNC: 9.1 MG/DL (ref 8.4–10.2)
CARDIAC TROPONIN I PNL SERPL HS: <2 NG/L
CHLORIDE SERPL-SCNC: 103 MMOL/L (ref 96–108)
CO2 SERPL-SCNC: 28 MMOL/L (ref 21–32)
CREAT SERPL-MCNC: 0.78 MG/DL (ref 0.6–1.3)
EOSINOPHIL # BLD AUTO: 0.18 THOUSAND/ÂΜL (ref 0–0.61)
EOSINOPHIL NFR BLD AUTO: 2 % (ref 0–6)
ERYTHROCYTE [DISTWIDTH] IN BLOOD BY AUTOMATED COUNT: 12.8 % (ref 11.6–15.1)
EXT PREGNANCY TEST URINE: NEGATIVE
EXT. CONTROL: NORMAL
GFR SERPL CREATININE-BSD FRML MDRD: 104 ML/MIN/1.73SQ M
GLUCOSE SERPL-MCNC: 80 MG/DL (ref 65–140)
HCT VFR BLD AUTO: 40.4 % (ref 34.8–46.1)
HGB BLD-MCNC: 14.2 G/DL (ref 11.5–15.4)
IMM GRANULOCYTES # BLD AUTO: 0.03 THOUSAND/UL (ref 0–0.2)
IMM GRANULOCYTES NFR BLD AUTO: 0 % (ref 0–2)
INR PPP: 1.05 (ref 0.84–1.19)
LYMPHOCYTES # BLD AUTO: 2.72 THOUSANDS/ÂΜL (ref 0.6–4.47)
LYMPHOCYTES NFR BLD AUTO: 28 % (ref 14–44)
MAGNESIUM SERPL-MCNC: 2 MG/DL (ref 1.9–2.7)
MCH RBC QN AUTO: 34.5 PG (ref 26.8–34.3)
MCHC RBC AUTO-ENTMCNC: 35.1 G/DL (ref 31.4–37.4)
MCV RBC AUTO: 98 FL (ref 82–98)
MONOCYTES # BLD AUTO: 0.45 THOUSAND/ÂΜL (ref 0.17–1.22)
MONOCYTES NFR BLD AUTO: 5 % (ref 4–12)
NEUTROPHILS # BLD AUTO: 6.18 THOUSANDS/ÂΜL (ref 1.85–7.62)
NEUTS SEG NFR BLD AUTO: 64 % (ref 43–75)
NRBC BLD AUTO-RTO: 0 /100 WBCS
PLATELET # BLD AUTO: 265 THOUSANDS/UL (ref 149–390)
PMV BLD AUTO: 9.5 FL (ref 8.9–12.7)
POTASSIUM SERPL-SCNC: 3.2 MMOL/L (ref 3.5–5.3)
PROT SERPL-MCNC: 6.8 G/DL (ref 6.4–8.4)
PROTHROMBIN TIME: 13.7 SECONDS (ref 11.6–14.5)
RBC # BLD AUTO: 4.12 MILLION/UL (ref 3.81–5.12)
SODIUM SERPL-SCNC: 138 MMOL/L (ref 135–147)
TSH SERPL DL<=0.05 MIU/L-ACNC: 1.33 UIU/ML (ref 0.45–4.5)
WBC # BLD AUTO: 9.62 THOUSAND/UL (ref 4.31–10.16)

## 2023-03-28 RX ORDER — KETOROLAC TROMETHAMINE 30 MG/ML
15 INJECTION, SOLUTION INTRAMUSCULAR; INTRAVENOUS ONCE
Status: COMPLETED | OUTPATIENT
Start: 2023-03-28 | End: 2023-03-28

## 2023-03-28 RX ORDER — ONDANSETRON 4 MG/1
4 TABLET, FILM COATED ORAL EVERY 6 HOURS
Qty: 12 TABLET | Refills: 0 | Status: SHIPPED | OUTPATIENT
Start: 2023-03-28

## 2023-03-28 RX ORDER — ACETAMINOPHEN 500 MG
500 TABLET ORAL EVERY 6 HOURS PRN
Qty: 30 TABLET | Refills: 0 | Status: SHIPPED | OUTPATIENT
Start: 2023-03-28

## 2023-03-28 RX ORDER — ACETAMINOPHEN 325 MG/1
650 TABLET ORAL ONCE
Status: COMPLETED | OUTPATIENT
Start: 2023-03-28 | End: 2023-03-28

## 2023-03-28 RX ORDER — METOCLOPRAMIDE 10 MG/1
10 TABLET ORAL ONCE
Status: COMPLETED | OUTPATIENT
Start: 2023-03-28 | End: 2023-03-28

## 2023-03-28 RX ORDER — IBUPROFEN 600 MG/1
600 TABLET ORAL EVERY 6 HOURS PRN
Qty: 30 TABLET | Refills: 0 | Status: SHIPPED | OUTPATIENT
Start: 2023-03-28 | End: 2023-03-29 | Stop reason: CLARIF

## 2023-03-28 RX ADMIN — METOCLOPRAMIDE 10 MG: 10 TABLET ORAL at 22:11

## 2023-03-28 RX ADMIN — ACETAMINOPHEN 650 MG: 325 TABLET ORAL at 22:10

## 2023-03-28 RX ADMIN — KETOROLAC TROMETHAMINE 15 MG: 30 INJECTION, SOLUTION INTRAMUSCULAR; INTRAVENOUS at 22:24

## 2023-03-28 RX ADMIN — SODIUM CHLORIDE 1000 ML: 0.9 INJECTION, SOLUTION INTRAVENOUS at 22:24

## 2023-03-29 VITALS
HEART RATE: 92 BPM | RESPIRATION RATE: 18 BRPM | TEMPERATURE: 98.2 F | BODY MASS INDEX: 26.21 KG/M2 | WEIGHT: 177.47 LBS | SYSTOLIC BLOOD PRESSURE: 143 MMHG | OXYGEN SATURATION: 99 % | DIASTOLIC BLOOD PRESSURE: 94 MMHG

## 2023-03-29 LAB
ATRIAL RATE: 99 BPM
P AXIS: 47 DEGREES
PR INTERVAL: 142 MS
QRS AXIS: 46 DEGREES
QRSD INTERVAL: 88 MS
QT INTERVAL: 334 MS
QTC INTERVAL: 428 MS
T WAVE AXIS: 28 DEGREES
VENTRICULAR RATE: 99 BPM

## 2023-03-29 RX ORDER — POTASSIUM CHLORIDE 20 MEQ/1
40 TABLET, EXTENDED RELEASE ORAL ONCE
Status: COMPLETED | OUTPATIENT
Start: 2023-03-29 | End: 2023-03-29

## 2023-03-29 RX ADMIN — POTASSIUM CHLORIDE 40 MEQ: 1500 TABLET, EXTENDED RELEASE ORAL at 00:09

## 2023-03-29 NOTE — ED PROVIDER NOTES
History  Chief Complaint   Patient presents with   • Headache     Pt c/o headache, SOB, and scalp pain     The patient is a 80-year-old female with history of depression who presents to the ED for evaluation of a 1 week history of headache, palpitations, and occasional shortness of breath  She also reports associated night sweats and increased bruising which has been present for the past 2 to 3 months  She does take an SSRI for depression  She describes the headache as a burning sensation that wraps from the back of her head to both sides  She states that even just touching her hair burns of the scalp  She denies radiation to the neck  She has never had a headache similar  It began gradually  She has been taking Tylenol over-the-counter with minimal improvement  She otherwise denies fever, neck stiffness, vision changes, numbness, paresthesia, focal weakness, vomiting, diarrhea, abdominal pain  Prior to Admission Medications   Prescriptions Last Dose Informant Patient Reported? Taking? FLUoxetine (PROzac) 10 mg capsule   Yes No   Sig: Take 10 mg by mouth daily   famotidine (PEPCID) 20 mg tablet   No No   Sig: Take 1 tablet (20 mg total) by mouth 2 (two) times a day   levonorgestrel (MIRENA) 20 MCG/24HR IUD   Yes No   Si each by Intrauterine route   ondansetron (Zofran ODT) 4 mg disintegrating tablet   No No   Sig: Take 1 tablet (4 mg total) by mouth every 6 (six) hours as needed for nausea or vomiting   phentermine 37 5 MG capsule   Yes No   Sig: Take 37 5 mg by mouth every morning   predniSONE 10 mg tablet   No No   Sig: Take 5 tabs po x 1 day; 4 tabs po x 1 day; 3 tabs po x 1 day; 2 tabs po x 1 day; 1 tab po x 1 day  Patient not taking: Reported on 12/3/2019   topiramate (TOPAMAX) 25 mg tablet   Yes No   Sig: Take 25 mg by mouth daily      Facility-Administered Medications: None       History reviewed  No pertinent past medical history  History reviewed   No pertinent surgical history  History reviewed  No pertinent family history  I have reviewed and agree with the history as documented  E-Cigarette/Vaping     E-Cigarette/Vaping Substances     Social History     Tobacco Use   • Smoking status: Never   • Smokeless tobacco: Never   Substance Use Topics   • Alcohol use: No   • Drug use: No       Review of Systems   Constitutional: Negative for chills, fever and unexpected weight change  HENT: Negative for congestion and rhinorrhea  Eyes: Negative for photophobia and visual disturbance  Respiratory: Positive for shortness of breath  Negative for cough  Cardiovascular: Positive for palpitations  Negative for chest pain and leg swelling  Gastrointestinal: Negative for abdominal pain, constipation, diarrhea, nausea and vomiting  Genitourinary: Negative for dysuria, flank pain, vaginal bleeding and vaginal discharge  Musculoskeletal: Negative for arthralgias, myalgias, neck pain and neck stiffness  Skin: Positive for rash (bruise)  Negative for wound  Neurological: Positive for tremors (R hand, intermittent, not present currently) and headaches  Negative for dizziness, syncope, weakness and numbness  Hematological: Negative for adenopathy  Psychiatric/Behavioral: Negative for behavioral problems  Physical Exam  Physical Exam  Vitals and nursing note reviewed  Constitutional:       General: She is not in acute distress  Appearance: She is well-developed  She is not ill-appearing or toxic-appearing  HENT:      Head: Normocephalic and atraumatic  Mouth/Throat:      Mouth: Mucous membranes are moist    Eyes:      Extraocular Movements: Extraocular movements intact  Conjunctiva/sclera: Conjunctivae normal       Pupils: Pupils are equal, round, and reactive to light  Cardiovascular:      Rate and Rhythm: Normal rate and regular rhythm  Pulses: Normal pulses  Heart sounds: Normal heart sounds  No murmur heard    Pulmonary:      Effort: Pulmonary effort is normal  No respiratory distress  Breath sounds: Normal breath sounds  No stridor  No wheezing or rales  Abdominal:      Palpations: Abdomen is soft  Tenderness: There is no abdominal tenderness  There is no guarding or rebound  Musculoskeletal:         General: No swelling or tenderness  Normal range of motion  Cervical back: Neck supple  No rigidity  Right lower leg: No edema  Left lower leg: No edema  Lymphadenopathy:      Cervical: No cervical adenopathy  Skin:     General: Skin is warm and dry  Capillary Refill: Capillary refill takes less than 2 seconds  Findings: Bruising present  No erythema, lesion, petechiae, rash or wound  Comments: Scattered bruises to bilateral legs in different stages of healing  Neurological:      General: No focal deficit present  Mental Status: She is alert and oriented to person, place, and time  Cranial Nerves: No cranial nerve deficit  Sensory: No sensory deficit  Motor: No weakness        Coordination: Coordination normal       Gait: Gait normal    Psychiatric:         Mood and Affect: Mood normal          Vital Signs  ED Triage Vitals   Temperature Pulse Respirations Blood Pressure SpO2   03/28/23 1948 03/28/23 1948 03/28/23 1948 03/28/23 1948 03/28/23 1948   98 2 °F (36 8 °C) 91 16 157/89 99 %      Temp Source Heart Rate Source Patient Position - Orthostatic VS BP Location FiO2 (%)   03/28/23 1948 03/28/23 2200 03/28/23 2200 03/28/23 2200 --   Oral Monitor Sitting Right arm       Pain Score       03/28/23 1948       7           Vitals:    03/28/23 1948 03/28/23 2200 03/29/23 0010   BP: 157/89 157/99 143/94   Pulse: 91 99 92   Patient Position - Orthostatic VS:  Sitting Lying         Visual Acuity      ED Medications  Medications   sodium chloride 0 9 % bolus 1,000 mL (0 mL Intravenous Stopped 3/28/23 2344)   ketorolac (TORADOL) injection 15 mg (15 mg Intravenous Given 3/28/23 2224) acetaminophen (TYLENOL) tablet 650 mg (650 mg Oral Given 3/28/23 2210)   metoclopramide (REGLAN) tablet 10 mg (10 mg Oral Given 3/28/23 2211)   potassium chloride (K-DUR,KLOR-CON) CR tablet 40 mEq (40 mEq Oral Given 3/29/23 0009)       Diagnostic Studies  Results Reviewed     Procedure Component Value Units Date/Time    TSH, 3rd generation with Free T4 reflex [[id number]]  (Normal) Collected: 03/28/23 2218    Lab Status: Final result Specimen: Blood from Arm, Right Updated: 03/28/23 2309     TSH 3RD GENERATON 1 331 uIU/mL     HS Troponin 0hr (reflex protocol) [[id number]]  (Normal) Collected: 03/28/23 2218    Lab Status: Final result Specimen: Blood from Arm, Right Updated: 03/28/23 2301     hs TnI 0hr <2 ng/L     Comprehensive metabolic panel [[id number]]  (Abnormal) Collected: 03/28/23 2218    Lab Status: Final result Specimen: Blood from Arm, Right Updated: 03/28/23 2255     Sodium 138 mmol/L      Potassium 3 2 mmol/L      Chloride 103 mmol/L      CO2 28 mmol/L      ANION GAP 7 mmol/L      BUN 6 mg/dL      Creatinine 0 78 mg/dL      Glucose 80 mg/dL      Calcium 9 1 mg/dL      AST 14 U/L      ALT 16 U/L      Alkaline Phosphatase 59 U/L      Total Protein 6 8 g/dL      Albumin 3 8 g/dL      Total Bilirubin 0 50 mg/dL      eGFR 104 ml/min/1 73sq m     Narrative:      [place] guidelines for Chronic Kidney Disease (CKD):   •  Stage 1 with normal or high GFR (GFR > 90 mL/min/1 73 square meters)  •  Stage 2 Mild CKD (GFR = 60-89 mL/min/1 73 square meters)  •  Stage 3A Moderate CKD (GFR = 45-59 mL/min/1 73 square meters)  •  Stage 3B Moderate CKD (GFR = 30-44 mL/min/1 73 square meters)  •  Stage 4 Severe CKD (GFR = 15-29 mL/min/1 73 square meters)  •  Stage 5 End Stage CKD (GFR <15 mL/min/1 73 square meters)  Note: GFR calculation is accurate only with a steady state creatinine    Magnesium [[id number]]  (Normal) Collected: 03/28/23 2218    Lab Status: Final result Specimen: Blood from Arm, Right Updated: 03/28/23 2255     Magnesium 2 0 mg/dL     CBC and differential [[id number]]  (Abnormal) Collected: 03/28/23 2218    Lab Status: Final result Specimen: Blood from Arm, Right Updated: 03/28/23 2251     WBC 9 62 Thousand/uL      RBC 4 12 Million/uL      Hemoglobin 14 2 g/dL      Hematocrit 40 4 %      MCV 98 fL      MCH 34 5 pg      MCHC 35 1 g/dL      RDW 12 8 %      MPV 9 5 fL      Platelets 123 Thousands/uL      nRBC 0 /100 WBCs      Neutrophils Relative 64 %      Immat GRANS % 0 %      Lymphocytes Relative 28 %      Monocytes Relative 5 %      Eosinophils Relative 2 %      Basophils Relative 1 %      Neutrophils Absolute 6 18 Thousands/µL      Immature Grans Absolute 0 03 Thousand/uL      Lymphocytes Absolute 2 72 Thousands/µL      Monocytes Absolute 0 45 Thousand/µL      Eosinophils Absolute 0 18 Thousand/µL      Basophils Absolute 0 06 Thousands/µL     Protime-INR [[id number]]  (Normal) Collected: 03/28/23 2218    Lab Status: Final result Specimen: Blood from Arm, Right Updated: 03/28/23 2249     Protime 13 7 seconds      INR 1 05    APTT [[id number]]  (Normal) Collected: 03/28/23 2218    Lab Status: Final result Specimen: Blood from Arm, Right Updated: 03/28/23 2249     PTT 25 seconds     POCT pregnancy, urine [[id number]]  (Normal) Resulted: 03/28/23 2139    Lab Status: Final result Updated: 03/28/23 2139     EXT Preg Test, Ur Negative     Control Valid                 No orders to display              Procedures  Procedures         ED Course  ED Course as of 03/29/23 0039   Tue Mar 28, 2023   2144 PREGNANCY TEST URINE: Negative   2255 WBC: 9 62   2255 Red Blood Cell Count: 4 12   2255 Hemoglobin: 14 2     EKG: NSR at 99 BPM, normal axis, normal intervals, no ST elevation or depressions as interpreted by me        SBIRT 22yo+    Flowsheet Row Most Recent Value   SBIRT (25 yo +)    In order to provide better care to our patients, we are screening all of our patients for alcohol and drug use   Would it be okay to ask you these screening questions? No Filed at: 03/28/2023 9063        Medical Decision Making  The patient is a 49-year-old female who presents to the ED for evaluation of headache, palpitations, bruising, palpitations, and occasional shortness of breath  On exam, the patient is in no acute distress  VSS  HRRR  Lungs CTA  Abdomen soft and nontender  Patient without neurologic deficit; CN II-XII grossly intact, EOMI, PERRLA, strength 5/5 in all extremities, sensation grossly intact  Differential diagnosis: Electrolyte abnormality, migraine, tension headache, occipital neuralgia,  thyroid abnormality, anemia, arrhythmia, blood diathesis  pericarditis less likely however will obtain troponin      Headache was not acute or maximal in onset  There are no high-risk variables including neck pain/stiffness, thunderclap headache quality  There is no limited neck flexion on examination  History and physical exam not suggestive of a secondary headache  Do not suspect SAH, temporal arteritis, meningitis, encephalitis, CO poisoning, acute angle closure glaucoma, dural venous sinus thrombosis as cause of headache  Do not feel that imaging for headache is warranted at this time  Will obtain TSH, coags, CBC, CMP, Troponin, EKG, Mg, and give migraine cocktail     Labs unremarkable other than mild hypokalemia  Klor given in ED  VSS  On re-examination, patient reports improvement in headache  Is comfortable with plan for discharge and PCP follow up  Will d/c    At the time of discharge, the patient is in no acute distress  I discussed with the patient the diagnosis, treatment plan, follow-up, return precautions, and discharge instructions; they were given the opportunity to ask questions and verbalized understanding  Amount and/or Complexity of Data Reviewed  Labs: ordered  Decision-making details documented in ED Course  Risk  OTC drugs  Prescription drug management            Disposition  Final diagnoses:   Occipital neuralgia   Bruising   Palpitations   Hypokalemia     Time reflects when diagnosis was documented in both MDM as applicable and the Disposition within this note     Time User Action Codes Description Comment    3/28/2023 11:58 PM Ayanna Hilary Add [M54 81] Occipital neuralgia     3/28/2023 11:58 PM Era Malena, [address]  8XXA] Bruising     3/29/2023 12:01 AM Ayanna Hilary Add [R00 2] Palpitations     3/29/2023 12:01 AM Ayanna Hilary Add [E87 6] Hypokalemia       ED Disposition     ED Disposition   Discharge    Condition   Stable    Date/Time   Tue Mar 28, 2023 2358    [address] discharge to home/self care                 Follow-up Information     Follow up With Specialties Details Why [address] MD Family [facility]   [address] [place] [id number]  [phone]            Discharge Medication List as of 3/29/2023 12:01 AM      START taking these medications    Details   acetaminophen (TYLENOL) 500 mg tablet Take 1 tablet (500 mg total) by mouth every 6 (six) hours as needed for mild pain or moderate pain, Starting Tue 3/28/2023, Normal      ibuprofen (MOTRIN) 600 mg tablet Take 1 tablet (600 mg total) by mouth every 6 (six) hours as needed for mild pain, Starting Tue 3/28/2023, Normal      ondansetron (ZOFRAN) 4 mg tablet Take 1 tablet (4 mg total) by mouth every 6 (six) hours, Starting Tue 3/28/2023, Normal         CONTINUE these medications which have NOT CHANGED    Details   famotidine (PEPCID) 20 mg tablet Take 1 tablet (20 mg total) by mouth 2 (two) times a day, Starting Tue 12/3/2019, Print      FLUoxetine (PROzac) 10 mg capsule Take 10 mg by mouth daily, Starting Tue 8/3/2021, Historical Med      levonorgestrel (MIRENA) 20 MCG/24HR IUD 1 each by Intrauterine route, Historical Med      ondansetron (Zofran ODT) 4 mg disintegrating tablet Take 1 tablet (4 mg total) by mouth every 6 (six) hours as needed for nausea or vomiting, Starting Thu 12/30/2021, Normal      phentermine 37 5 MG capsule Take 37 5 mg by mouth every morning, Historical Med      predniSONE 10 mg tablet Take 5 tabs po x 1 day; 4 tabs po x 1 day; 3 tabs po x 1 day; 2 tabs po x 1 day; 1 tab po x 1 day , Print      topiramate (TOPAMAX) 25 mg tablet Take 25 mg by mouth daily, Historical Med             No discharge procedures on file      PDMP Review     None          ED Provider  Electronically Signed by           Kylah López PA-C  03/29/23 0734

## 2023-03-29 NOTE — DISCHARGE INSTRUCTIONS
Follow up with your PCP about headaches, bruising    Return for worsening headache, neck stiffness, numbness/tingling, vision changes, sudden severe headache, rash, or any other new or concerning symptoms     Take Ibuprofen as prescribed as needed for pain  Do not take on an empty stomach       You may also take Tylenol as prescribed (500 mg every 6 hours as needed for pain)    Take Zofran as needed for nausea secondary to headache as prescribed

## 2023-08-15 ENCOUNTER — OFFICE VISIT (OUTPATIENT)
Dept: URGENT CARE | Facility: MEDICAL CENTER | Age: 28
End: 2023-08-15
Payer: COMMERCIAL

## 2023-08-15 ENCOUNTER — APPOINTMENT (OUTPATIENT)
Dept: RADIOLOGY | Facility: MEDICAL CENTER | Age: 28
End: 2023-08-15
Payer: COMMERCIAL

## 2023-08-15 VITALS
SYSTOLIC BLOOD PRESSURE: 112 MMHG | OXYGEN SATURATION: 100 % | TEMPERATURE: 97.8 F | DIASTOLIC BLOOD PRESSURE: 66 MMHG | RESPIRATION RATE: 18 BRPM | HEART RATE: 84 BPM

## 2023-08-15 DIAGNOSIS — S93.401A SPRAIN OF RIGHT ANKLE, UNSPECIFIED LIGAMENT, INITIAL ENCOUNTER: Primary | ICD-10-CM

## 2023-08-15 DIAGNOSIS — M25.571 ACUTE RIGHT ANKLE PAIN: ICD-10-CM

## 2023-08-15 PROCEDURE — 99213 OFFICE O/P EST LOW 20 MIN: CPT

## 2023-08-15 PROCEDURE — 73610 X-RAY EXAM OF ANKLE: CPT

## 2023-08-15 NOTE — LETTER
August 15, 2023     Patient: Brannon Rush   YOB: 1995   Date of Visit: 8/15/2023       To Whom it May Concern:    Brannon Rush was seen in my clinic on 8/15/2023. She may return to work on 8/17/23 . If you have any questions or concerns, please don't hesitate to call.          Sincerely,          Danielle Stark PA-C        CC: No Recipients

## 2023-08-15 NOTE — PROGRESS NOTES
[facility]        NAME: Esther Reyna is a 29 y.o. female  : 1995    MRN: [id number]  DATE: August 15, 2023  TIME: 3:56 PM    Assessment and Plan   Sprain of right ankle, unspecified ligament, initial encounter [S93.401A]  1. Sprain of right ankle, unspecified ligament, initial encounter        2. Acute right ankle pain  XR ankle 3+ vw right        Right ankle x-ray: No acute osseous abnormalities. Radiology will determine final read. Provided with boot. Instructed on symptomatic treatment, PCP follow up. Patient Instructions     Your ankle x-ray did not show any acute abnormalities. Recommend rest, ice, elevation. Over the counter pain medications as needed. Boot provided for support, wean off as tolerated. Follow up with PCP in 3-5 days. Proceed to  ER if symptoms worsen. Chief Complaint     Chief Complaint   Patient presents with   • right ankle pain     About a week ago fell and twisted her ankle. Had been wearing a support brace. Again had another fall 3 or 4 days ago and now has had more pain. Swelling noted. Not taking anything for the pain. No ice. Is able to bear weight on it but feels weak. She states that is gives out at times when walking. History of Present Illness       Ankle Pain   Incident onset: about a week and a half ago fell, then 2-3 days ago fell again. Injury mechanism: twisted ankle both times when walking, causing her to fall to the ground - states she feels like her ankle gave out on her. The pain is present in the right ankle. The quality of the pain is described as aching. The pain is at a severity of 6/10. The pain has been constant since onset. Associated symptoms include numbness and tingling. Pertinent negatives include no inability to bear weight, loss of motion or loss of sensation. The symptoms are aggravated by weight bearing, movement and palpation. Treatments tried: elevation, ankle brace/support. The treatment provided mild relief. Review of Systems   Review of Systems   Musculoskeletal: Positive for arthralgias and joint swelling. Skin: Negative for color change. Neurological: Positive for tingling and numbness. Current Medications       Current Outpatient Medications:   •  acetaminophen (TYLENOL) 500 mg tablet, Take 1 tablet (500 mg total) by mouth every 6 (six) hours as needed for mild pain or moderate pain, Disp: 30 tablet, Rfl: 0  •  famotidine (PEPCID) 20 mg tablet, Take 1 tablet (20 mg total) by mouth 2 (two) times a day, Disp: 28 tablet, Rfl: 0  •  FLUoxetine (PROzac) 10 mg capsule, Take 10 mg by mouth daily, Disp: , Rfl:   •  levonorgestrel (MIRENA) 20 MCG/24HR IUD, 1 each by Intrauterine route, Disp: , Rfl:   •  ondansetron (Zofran ODT) 4 mg disintegrating tablet, Take 1 tablet (4 mg total) by mouth every 6 (six) hours as needed for nausea or vomiting, Disp: 20 tablet, Rfl: 0  •  ondansetron (ZOFRAN) 4 mg tablet, Take 1 tablet (4 mg total) by mouth every 6 (six) hours, Disp: 12 tablet, Rfl: 0  •  phentermine 37.5 MG capsule, Take 37.5 mg by mouth every morning, Disp: , Rfl:   •  predniSONE 10 mg tablet, Take 5 tabs po x 1 day; 4 tabs po x 1 day; 3 tabs po x 1 day; 2 tabs po x 1 day; 1 tab po x 1 day. (Patient not taking: Reported on 12/3/2019), Disp: 15 tablet, Rfl: 0  •  topiramate (TOPAMAX) 25 mg tablet, Take 25 mg by mouth daily, Disp: , Rfl:     Current Allergies     Allergies as of 08/15/2023   • (No Known Allergies)            The following portions of the patient's history were reviewed and updated as appropriate: allergies, current medications, past family history, past medical history, past social history, past surgical history and problem list.     No past medical history on file. No past surgical history on file. No family history on file. Medications have been verified.         Objective   /66 (BP Location: Left arm, Patient Position: Sitting)   Pulse 84   Temp 97.8 °F (36.6 °C)   Resp 18   SpO2 100%        Physical Exam     Physical Exam  Vitals and nursing note reviewed. Constitutional:       General: She is not in acute distress. Appearance: Normal appearance. She is not ill-appearing. Cardiovascular:      Rate and Rhythm: Normal rate and regular rhythm. Pulses: Normal pulses. Heart sounds: Normal heart sounds. Pulmonary:      Effort: Pulmonary effort is normal.      Breath sounds: Normal breath sounds. Musculoskeletal:      Right ankle: Swelling present. No deformity or ecchymosis. Tenderness present over the ATF ligament and CF ligament. Normal range of motion. Normal pulse. Right Achilles Tendon: No tenderness or defects. Cash's test negative. Right foot: Normal capillary refill. No swelling, deformity, tenderness or bony tenderness. Normal pulse. Neurological:      Mental Status: She is alert.

## 2023-08-15 NOTE — PATIENT INSTRUCTIONS
Your ankle x-ray did not show any acute abnormalities. Recommend rest, ice, elevation. Over the counter pain medications as needed. Boot provided for support, wean off as tolerated. Follow up with PCP in 3-5 days. Proceed to  ER if symptoms worsen. Ankle Sprain   AMBULATORY CARE:   An ankle sprain  happens when 1 or more ligaments in your ankle joint stretch or tear. Ligaments are tough tissues that connect bones. Ligaments support your joints and keep your bones in place. Common signs and symptoms:   Trouble moving your ankle or foot    Pain when you touch or put weight on your ankle    Bruised, swollen, or misshapen ankle    Seek care immediately if:   You have severe pain in your ankle. Your foot or toes are cold or numb. Your ankle becomes more weak or unstable (wobbly). You are unable to put any weight on your ankle or foot. Your swelling has increased or returned. Call your doctor if:   Your pain does not go away, even after treatment. You have questions or concerns about your condition or care. Treatment:   Medicines:      NSAIDs , such as ibuprofen, help decrease swelling, pain, and fever. This medicine is available with or without a doctor's order. NSAIDs can cause stomach bleeding or kidney problems in certain people. If you take blood thinner medicine, always ask your healthcare provider if NSAIDs are safe for you. Always read the medicine label and follow directions. Acetaminophen  decreases pain and fever. It is available without a doctor's order. Ask how much to take and how often to take it. Follow directions. Read the labels of all other medicines you are using to see if they also contain acetaminophen, or ask your doctor or pharmacist. Acetaminophen can cause liver damage if not taken correctly. Prescription pain medicine  may be given. Ask your healthcare provider how to take this medicine safely. Some prescription pain medicines contain acetaminophen.  Do not take other medicines that contain acetaminophen without talking to your healthcare provider. Too much acetaminophen may cause liver damage. Prescription pain medicine may cause constipation. Ask your healthcare provider how to prevent or treat constipation. Surgery  may be needed to repair or replace a torn ligament if your sprain does not heal with other treatments. Your healthcare provider may use screws to attach the bones in your ankle together. The screws may help support your ankle and make it stable. Ask your healthcare provider for more information about surgery to treat your ankle sprain. Self-care:   Use support devices , such as a brace, cast, or splint, to limit your movement and protect your joint. You may need to use crutches to decrease your pain as you move around. Go to physical therapy  as directed. A physical therapist teaches you exercises to help improve movement and strength, and to decrease pain. Rest  your ankle so that it can heal. Return to normal activities as directed. Apply ice  on your ankle for 15 to 20 minutes every hour or as directed. Use an ice pack, or put crushed ice in a plastic bag. Cover the ice pack or bag with a towel before you put it on your injury. Ice helps prevent tissue damage and decreases swelling and pain. Compress  your ankle. Ask if you should wrap an elastic bandage around your injured ligament. An elastic bandage provides support and helps decrease swelling and movement so your joint can heal. Wear as long as directed. Elevate  your ankle above the level of your heart as often as you can. This will help decrease swelling and pain. Prop your ankle on pillows or blankets to keep it elevated comfortably. Prevent another ankle sprain:   Let your ankle heal.  Find out how long your ligament needs to heal. Do not do any physical activity until your healthcare provider says it is okay.  If you start activity too soon, you may develop a more serious injury. Warm up and stretch before you exercise or play sports. This helps your joints become strong and flexible. Use the right equipment. Always wear shoes that fit well and are made for the activity that you are doing. You may also need ankle supports, elbow and knee pads, or braces. Follow up with your doctor as directed:  Write down your questions so you remember to ask them during your visits. © Copyright Katrin Guardian 2022 Information is for End User's use only and may not be sold, redistributed or otherwise used for commercial purposes. The above information is an  only. It is not intended as medical advice for individual conditions or treatments. Talk to your doctor, nurse or pharmacist before following any medical regimen to see if it is safe and effective for you.

## 2024-01-08 ENCOUNTER — HOSPITAL ENCOUNTER (EMERGENCY)
Facility: HOSPITAL | Age: 29
Discharge: HOME | End: 2024-01-08
Attending: EMERGENCY MEDICINE

## 2024-01-08 ENCOUNTER — APPOINTMENT (EMERGENCY)
Dept: RADIOLOGY | Facility: HOSPITAL | Age: 29
End: 2024-01-08

## 2024-01-08 VITALS
TEMPERATURE: 97.9 F | DIASTOLIC BLOOD PRESSURE: 70 MMHG | HEART RATE: 74 BPM | OXYGEN SATURATION: 100 % | SYSTOLIC BLOOD PRESSURE: 163 MMHG | RESPIRATION RATE: 16 BRPM

## 2024-01-08 DIAGNOSIS — J98.2 PNEUMOMEDIASTINUM (HCC): Primary | ICD-10-CM

## 2024-01-08 LAB
ALBUMIN SERPL BCP-MCNC: 4 G/DL (ref 3.5–5)
ALP SERPL-CCNC: 51 U/L (ref 34–104)
ALT SERPL W P-5'-P-CCNC: 14 U/L (ref 7–52)
ANION GAP SERPL CALCULATED.3IONS-SCNC: 7 MMOL/L
AST SERPL W P-5'-P-CCNC: 20 U/L (ref 13–39)
ATRIAL RATE: 85 BPM
BACTERIA UR QL AUTO: ABNORMAL /HPF
BASOPHILS # BLD AUTO: 0.04 THOUSANDS/ÂΜL (ref 0–0.1)
BASOPHILS NFR BLD AUTO: 0 % (ref 0–1)
BILIRUB SERPL-MCNC: 1.3 MG/DL (ref 0.2–1)
BILIRUB UR QL STRIP: NEGATIVE
BUN SERPL-MCNC: 5 MG/DL (ref 5–25)
CALCIUM SERPL-MCNC: 9.5 MG/DL (ref 8.4–10.2)
CARDIAC TROPONIN I PNL SERPL HS: <2 NG/L
CHLORIDE SERPL-SCNC: 99 MMOL/L (ref 96–108)
CLARITY UR: CLEAR
CO2 SERPL-SCNC: 29 MMOL/L (ref 21–32)
COLOR UR: YELLOW
CREAT SERPL-MCNC: 0.74 MG/DL (ref 0.6–1.3)
D DIMER PPP FEU-MCNC: <0.27 UG/ML FEU
EOSINOPHIL # BLD AUTO: 0.03 THOUSAND/ÂΜL (ref 0–0.61)
EOSINOPHIL NFR BLD AUTO: 0 % (ref 0–6)
ERYTHROCYTE [DISTWIDTH] IN BLOOD BY AUTOMATED COUNT: 12.3 % (ref 11.6–15.1)
EXT PREGNANCY TEST URINE: NEGATIVE
EXT. CONTROL: NORMAL
FLUAV RNA RESP QL NAA+PROBE: NEGATIVE
FLUBV RNA RESP QL NAA+PROBE: NEGATIVE
GFR SERPL CREATININE-BSD FRML MDRD: 110 ML/MIN/1.73SQ M
GLUCOSE SERPL-MCNC: 86 MG/DL (ref 65–140)
GLUCOSE UR STRIP-MCNC: NEGATIVE MG/DL
HCT VFR BLD AUTO: 39.5 % (ref 34.8–46.1)
HGB BLD-MCNC: 14 G/DL (ref 11.5–15.4)
HGB UR QL STRIP.AUTO: NEGATIVE
IMM GRANULOCYTES # BLD AUTO: 0.04 THOUSAND/UL (ref 0–0.2)
IMM GRANULOCYTES NFR BLD AUTO: 0 % (ref 0–2)
KETONES UR STRIP-MCNC: ABNORMAL MG/DL
LEUKOCYTE ESTERASE UR QL STRIP: ABNORMAL
LYMPHOCYTES # BLD AUTO: 1.33 THOUSANDS/ÂΜL (ref 0.6–4.47)
LYMPHOCYTES NFR BLD AUTO: 13 % (ref 14–44)
MCH RBC QN AUTO: 34.4 PG (ref 26.8–34.3)
MCHC RBC AUTO-ENTMCNC: 35.4 G/DL (ref 31.4–37.4)
MCV RBC AUTO: 97 FL (ref 82–98)
MONOCYTES # BLD AUTO: 0.46 THOUSAND/ÂΜL (ref 0.17–1.22)
MONOCYTES NFR BLD AUTO: 4 % (ref 4–12)
MUCOUS THREADS UR QL AUTO: ABNORMAL
NEUTROPHILS # BLD AUTO: 8.73 THOUSANDS/ÂΜL (ref 1.85–7.62)
NEUTS SEG NFR BLD AUTO: 83 % (ref 43–75)
NITRITE UR QL STRIP: POSITIVE
NON-SQ EPI CELLS URNS QL MICRO: ABNORMAL /HPF
NRBC BLD AUTO-RTO: 0 /100 WBCS
P AXIS: 68 DEGREES
PH UR STRIP.AUTO: 7 [PH] (ref 4.5–8)
PLATELET # BLD AUTO: 246 THOUSANDS/UL (ref 149–390)
PMV BLD AUTO: 9.5 FL (ref 8.9–12.7)
POTASSIUM SERPL-SCNC: 3.6 MMOL/L (ref 3.5–5.3)
PR INTERVAL: 134 MS
PROT SERPL-MCNC: 6.7 G/DL (ref 6.4–8.4)
PROT UR STRIP-MCNC: ABNORMAL MG/DL
QRS AXIS: 46 DEGREES
QRSD INTERVAL: 86 MS
QT INTERVAL: 392 MS
QTC INTERVAL: 466 MS
RBC # BLD AUTO: 4.07 MILLION/UL (ref 3.81–5.12)
RBC #/AREA URNS AUTO: ABNORMAL /HPF
RSV RNA RESP QL NAA+PROBE: NEGATIVE
SARS-COV-2 RNA RESP QL NAA+PROBE: NEGATIVE
SODIUM SERPL-SCNC: 135 MMOL/L (ref 135–147)
SP GR UR STRIP.AUTO: 1.01 (ref 1–1.03)
T WAVE AXIS: 48 DEGREES
UROBILINOGEN UR QL STRIP.AUTO: 0.2 E.U./DL
VENTRICULAR RATE: 85 BPM
WBC # BLD AUTO: 10.63 THOUSAND/UL (ref 4.31–10.16)
WBC #/AREA URNS AUTO: ABNORMAL /HPF

## 2024-01-08 PROCEDURE — 84484 ASSAY OF TROPONIN QUANT: CPT | Performed by: EMERGENCY MEDICINE

## 2024-01-08 PROCEDURE — 96361 HYDRATE IV INFUSION ADD-ON: CPT

## 2024-01-08 PROCEDURE — 93005 ELECTROCARDIOGRAM TRACING: CPT

## 2024-01-08 PROCEDURE — 99285 EMERGENCY DEPT VISIT HI MDM: CPT

## 2024-01-08 PROCEDURE — 85379 FIBRIN DEGRADATION QUANT: CPT | Performed by: EMERGENCY MEDICINE

## 2024-01-08 PROCEDURE — 85025 COMPLETE CBC W/AUTO DIFF WBC: CPT | Performed by: EMERGENCY MEDICINE

## 2024-01-08 PROCEDURE — 71046 X-RAY EXAM CHEST 2 VIEWS: CPT

## 2024-01-08 PROCEDURE — 80053 COMPREHEN METABOLIC PANEL: CPT | Performed by: EMERGENCY MEDICINE

## 2024-01-08 PROCEDURE — 99285 EMERGENCY DEPT VISIT HI MDM: CPT | Performed by: EMERGENCY MEDICINE

## 2024-01-08 PROCEDURE — 36415 COLL VENOUS BLD VENIPUNCTURE: CPT | Performed by: EMERGENCY MEDICINE

## 2024-01-08 PROCEDURE — 81001 URINALYSIS AUTO W/SCOPE: CPT

## 2024-01-08 PROCEDURE — 0241U HB NFCT DS VIR RESP RNA 4 TRGT: CPT | Performed by: EMERGENCY MEDICINE

## 2024-01-08 PROCEDURE — 96374 THER/PROPH/DIAG INJ IV PUSH: CPT

## 2024-01-08 PROCEDURE — 81025 URINE PREGNANCY TEST: CPT | Performed by: EMERGENCY MEDICINE

## 2024-01-08 RX ORDER — KETOROLAC TROMETHAMINE 30 MG/ML
30 INJECTION, SOLUTION INTRAMUSCULAR; INTRAVENOUS ONCE
Status: COMPLETED | OUTPATIENT
Start: 2024-01-08 | End: 2024-01-08

## 2024-01-08 RX ADMIN — KETOROLAC TROMETHAMINE 30 MG: 30 INJECTION, SOLUTION INTRAMUSCULAR; INTRAVENOUS at 10:14

## 2024-01-08 RX ADMIN — SODIUM CHLORIDE 1000 ML: 0.9 INJECTION, SOLUTION INTRAVENOUS at 09:54

## 2024-01-08 NOTE — Clinical Note
Lena Ken was seen and treated in our emergency department on 1/8/2024.                Diagnosis:     Lena  may return to work on return date.    She may return on this date: 01/11/2024         If you have any questions or concerns, please don't hesitate to call.      Princess Hubbard, DO    ______________________________           _______________          _______________  Hospital Representative                              Date                                Time

## 2024-01-08 NOTE — ED PROVIDER NOTES
Pt Name: Lena Ken  MRN: [id number]  Birthdate 1995  Age/Sex: 28 y.o. female  Date of evaluation: 1/8/2024  PCP: Lucy Burnette MD    CHIEF COMPLAINT    Chief Complaint   Patient presents with    Chest Pain     Chest pain that radiates to R side and arm, started yesterday. Arm feels weak. Has SOB. Was at an event yesterday and was feeling weak. After she left that's when she started having chest pain.         HPI    Lena presents to the Emergency Department complaining of chest pain radiating to right arm.  The symptoms started yesterday and are not improving.  She has not been sick.  No significant cough.  She had no injury/ trauma to the area.       HPI      Past Medical and Surgical History    History reviewed. No pertinent past medical history.    Past Surgical History:   Procedure Laterality Date    BARIATRIC SURGERY         History reviewed. No pertinent family history.    Social History     Tobacco Use    Smoking status: Never    Smokeless tobacco: Never   Substance Use Topics    Alcohol use: Yes    Drug use: No         .    Allergies    No Known Allergies    Home Medications    Prior to Admission medications    Medication Sig Start Date End Date Taking? Authorizing Provider   acetaminophen (TYLENOL) 500 mg tablet Take 1 tablet (500 mg total) by mouth every 6 (six) hours as needed for mild pain or moderate pain 3/28/23   Anabelle Isabel PA-C   famotidine (PEPCID) 20 mg tablet Take 1 tablet (20 mg total) by mouth 2 (two) times a day 12/3/19   Austyn Max MD   FLUoxetine (PROzac) 10 mg capsule Take 10 mg by mouth daily 8/3/21   Historical Provider, MD   levonorgestrel (MIRENA) 20 MCG/24HR IUD 1 each by Intrauterine route    Historical Provider, MD   ondansetron (Zofran ODT) 4 mg disintegrating tablet Take 1 tablet (4 mg total) by mouth every 6 (six) hours as needed for nausea or vomiting 12/30/21   Uzair Goddard PA-C   ondansetron (ZOFRAN) 4 mg tablet Take 1 tablet (4  mg total) by mouth every 6 (six) hours 3/28/23   Anabelle Isabel PA-C   phentermine 37.5 MG capsule Take 37.5 mg by mouth every morning    Historical Provider, MD   predniSONE 10 mg tablet Take 5 tabs po x 1 day; 4 tabs po x 1 day; 3 tabs po x 1 day; 2 tabs po x 1 day; 1 tab po x 1 day.  Patient not taking: Reported on 12/3/2019 9/25/19   Majo Turner DO   topiramate (TOPAMAX) 25 mg tablet Take 25 mg by mouth daily    Historical Provider, MD           Review of Systems    Review of Systems   Constitutional:  Negative for chills and fever.   HENT:  Negative for ear pain and sore throat.    Eyes:  Negative for pain and visual disturbance.   Respiratory:  Positive for chest tightness. Negative for cough and shortness of breath.    Cardiovascular:  Negative for chest pain and palpitations.   Gastrointestinal:  Negative for abdominal pain and vomiting.   Genitourinary:  Negative for dysuria and hematuria.   Musculoskeletal:  Negative for arthralgias and back pain.   Skin:  Negative for color change and rash.   Neurological:  Negative for seizures and syncope.   All other systems reviewed and are negative.        Physical Exam      ED Triage Vitals [01/08/24 0935]   Temperature Pulse Respirations Blood Pressure SpO2   97.9 °F (36.6 °C) 86 16 162/88 100 %      Temp Source Heart Rate Source Patient Position - Orthostatic VS BP Location FiO2 (%)   Oral Monitor Sitting Right arm --      Pain Score       8               Physical Exam  Vitals and nursing note reviewed.   Constitutional:       General: She is not in acute distress.     Appearance: She is well-developed.   HENT:      Head: Normocephalic and atraumatic.   Eyes:      Conjunctiva/sclera: Conjunctivae normal.   Cardiovascular:      Rate and Rhythm: Normal rate and regular rhythm.      Heart sounds: No murmur heard.  Pulmonary:      Effort: Pulmonary effort is normal. No respiratory distress.      Breath sounds: Normal breath sounds.   Abdominal:       Palpations: Abdomen is soft.      Tenderness: There is no abdominal tenderness.   Musculoskeletal:         General: No swelling.      Cervical back: Neck supple.   Skin:     General: Skin is warm and dry.      Capillary Refill: Capillary refill takes less than 2 seconds.   Neurological:      Mental Status: She is alert.   Psychiatric:         Mood and Affect: Mood normal.           Assessment and Plan    Lena Ken is a 28 y.o. female who presents with chest pain. Physical examination unremarkable. Differential diagnosis (not completely inclusive) includes cardiopulmonary/ infectious/ musculoskeletal. Plan will be to perform diagnostic testing and treat symptomatically.      MDM      Diagnostic Results    EKG:  normal EKG, normal sinus rhythm, unchanged from previous tracings    EKG INTERPRETATION  EKG Interpretation    Rate: 85 BPM  Rhythm: sinus   Axis: normal  Intervals: Normal, no blocks, QTc 466 ms  T waves: normal  ST segments: normal    Impression: normal EKG. EKG for comparison: reveiwed  EKG interpreted by me.   Interpretation by Princess Hubbard,   EKG reviewed and interpreted independently.    Labs:    Results for orders placed or performed during the hospital encounter of 01/08/24   FLU/RSV/COVID - if FLU/RSV clinically relevant    Specimen: Nose; Nares   Result Value Ref Range    SARS-CoV-2 Negative Negative    INFLUENZA A PCR Negative Negative    INFLUENZA B PCR Negative Negative    RSV PCR Negative Negative   CBC and differential   Result Value Ref Range    WBC 10.63 (H) 4.31 - 10.16 Thousand/uL    RBC 4.07 3.81 - 5.12 Million/uL    Hemoglobin 14.0 11.5 - 15.4 g/dL    Hematocrit 39.5 34.8 - 46.1 %    MCV 97 82 - 98 fL    MCH 34.4 (H) 26.8 - 34.3 pg    MCHC 35.4 31.4 - 37.4 g/dL    RDW 12.3 11.6 - 15.1 %    MPV 9.5 8.9 - 12.7 fL    Platelets 246 149 - 390 Thousands/uL    nRBC 0 /100 WBCs    Neutrophils Relative 83 (H) 43 - 75 %    Immat GRANS % 0 0 - 2 %    Lymphocytes Relative 13 (L)  "14 - 44 %    Monocytes Relative 4 4 - 12 %    Eosinophils Relative 0 0 - 6 %    Basophils Relative 0 0 - 1 %    Neutrophils Absolute 8.73 (H) 1.85 - 7.62 Thousands/µL    Immature Grans Absolute 0.04 0.00 - 0.20 Thousand/uL    Lymphocytes Absolute 1.33 0.60 - 4.47 Thousands/µL    Monocytes Absolute 0.46 0.17 - 1.22 Thousand/µL    Eosinophils Absolute 0.03 0.00 - 0.61 Thousand/µL    Basophils Absolute 0.04 0.00 - 0.10 Thousands/µL   Comprehensive metabolic panel   Result Value Ref Range    Sodium 135 135 - 147 mmol/L    Potassium 3.6 3.5 - 5.3 mmol/L    Chloride 99 96 - 108 mmol/L    CO2 29 21 - 32 mmol/L    ANION GAP 7 mmol/L    BUN 5 5 - 25 mg/dL    Creatinine 0.74 0.60 - 1.30 mg/dL    Glucose 86 65 - 140 mg/dL    Calcium 9.5 8.4 - 10.2 mg/dL    AST 20 13 - 39 U/L    ALT 14 7 - 52 U/L    Alkaline Phosphatase 51 34 - 104 U/L    Total Protein 6.7 6.4 - 8.4 g/dL    Albumin 4.0 3.5 - 5.0 g/dL    Total Bilirubin 1.30 (H) 0.20 - 1.00 mg/dL    eGFR 110 ml/min/1.73sq m   HS Troponin 0hr (reflex protocol)   Result Value Ref Range    hs TnI 0hr <2 \"Refer to ACS Flowchart\"- see link ng/L   D-Dimer   Result Value Ref Range    D-Dimer, Quant <0.27 <0.50 ug/ml FEU   Urine Microscopic   Result Value Ref Range    RBC, UA 1-2 None Seen, 1-2 /hpf    WBC, UA 4-10 (A) None Seen, 1-2 /hpf    Epithelial Cells Occasional None Seen, Occasional /hpf    Bacteria, UA Occasional None Seen, Occasional /hpf    MUCUS THREADS Moderate (A) None Seen   POCT pregnancy, urine   Result Value Ref Range    EXT Preg Test, Ur Negative     Control Valid    ECG 12 lead   Result Value Ref Range    Ventricular Rate 85 BPM    Atrial Rate 85 BPM    AZ Interval 134 ms    QRSD Interval 86 ms    QT Interval 392 ms    QTC Interval 466 ms    P Axis 68 degrees    QRS Axis 46 degrees    T Wave Axis 48 degrees   Urine Macroscopic, POC   Result Value Ref Range    Color, UA Yellow     Clarity, UA Clear     pH, UA 7.0 4.5 - 8.0    Leukocytes, UA Trace (A) Negative    " Nitrite, UA Positive (A) Negative    Protein, UA 30 (1+) (A) Negative mg/dl    Glucose, UA Negative Negative mg/dl    Ketones, UA Trace (A) Negative mg/dl    Urobilinogen, UA 0.2 0.2, 1.0 E.U./dl E.U./dl    Bilirubin, UA Negative Negative    Occult Blood, UA Negative Negative    Specific Gravity, UA 1.015 1.003 - 1.030       All labs reviewed and utilized in the medical decision making process    Radiology:    XR chest 2 views   Final Result      Mild pneumomediastinum with subcutaneous emphysema in the lower neck.      I notified Dr. PRINCESS HUBBARD on 1/8/2024 10:23 AM by secure text and she responded immediately..                     Workstation performed: [id number]             All radiology studies independently viewed by me and interpreted by the radiologist.    Procedure    Procedures      ED Course of Care and Re-Assessments    Patient admits to vaping.  We discussed this as a possible reason for her diagnosis.  Return precautions discussed.      Medications   sodium chloride 0.9 % bolus 1,000 mL (0 mL Intravenous Stopped 1/8/24 1104)   ketorolac (TORADOL) injection 30 mg (30 mg Intravenous Given 1/8/24 1014)           FINAL IMPRESSION    Final diagnoses:   Pneumomediastinum (HCC)         DISPOSITION/PLAN      Time reflects when diagnosis was documented in both MDM as applicable and the Disposition within this note       Time User Action Codes Description Comment    1/8/2024 11:01 AM Princess Hubbard [J98.2] Pneumomediastinum (HCC)           ED Disposition       ED Disposition   Discharge    Condition   Stable    Date/Time   Mon Jan 8, 2024 11:01 AM    Comment   Lena Ken discharge to home/self care.                   Follow-up Information       Follow up With Specialties Details Why Contact Info Additional Information    Lucy Burnette MD Family Medicine Schedule an appointment as soon as possible for a visit   [address] [place] [address]  [id number] [phone]       [facility] Emergency Department Emergency Medicine  As needed, If symptoms worsen [address] [facility] [id number] [phone] [facility] Emergency Department, [address] [place], [place], [address]              PATIENT REFERRED TO:    Lucy Burnette MD  [address] [place] [id number] [phone]    Schedule an appointment as soon as possible for a visit       [facility] Emergency Department  [address] [place] [id number] [phone]    As needed, If symptoms worsen      DISCHARGE MEDICATIONS:    Discharge Medication List as of 1/8/2024 11:02 AM        CONTINUE these medications which have NOT CHANGED    Details   acetaminophen (TYLENOL) 500 mg tablet Take 1 tablet (500 mg total) by mouth every 6 (six) hours as needed for mild pain or moderate pain, Starting Tue 3/28/2023, Normal      famotidine (PEPCID) 20 mg tablet Take 1 tablet (20 mg total) by mouth 2 (two) times a day, Starting Tue 12/3/2019, Print      FLUoxetine (PROzac) 10 mg capsule Take 10 mg by mouth daily, Starting Tue 8/3/2021, Historical Med      levonorgestrel (MIRENA) 20 MCG/24HR IUD 1 each by Intrauterine route, Historical Med      ondansetron (Zofran ODT) 4 mg disintegrating tablet Take 1 tablet (4 mg total) by mouth every 6 (six) hours as needed for nausea or vomiting, Starting Thu 12/30/2021, Normal      ondansetron (ZOFRAN) 4 mg tablet Take 1 tablet (4 mg total) by mouth every 6 (six) hours, Starting Tue 3/28/2023, Normal      phentermine 37.5 MG capsule Take 37.5 mg by mouth every morning, Historical Med      predniSONE 10 mg tablet Take 5 tabs po x 1 day; 4 tabs po x 1 day; 3 tabs po x 1 day; 2 tabs po x 1 day; 1 tab po x 1 day., Print      topiramate (TOPAMAX) 25 mg tablet Take 25 mg by mouth daily, Historical Med             No discharge procedures on file.         Princess SCHWAB  DO Princess Hubbard,   01/08/24 6950

## 2024-08-29 ENCOUNTER — HOSPITAL ENCOUNTER (EMERGENCY)
Facility: HOSPITAL | Age: 29
Discharge: HOME | End: 2024-08-29
Attending: EMERGENCY MEDICINE
Payer: COMMERCIAL

## 2024-08-29 ENCOUNTER — APPOINTMENT (EMERGENCY)
Dept: CT IMAGING | Facility: HOSPITAL | Age: 29
End: 2024-08-29
Payer: COMMERCIAL

## 2024-08-29 VITALS
HEIGHT: 69 IN | OXYGEN SATURATION: 100 % | HEART RATE: 72 BPM | RESPIRATION RATE: 15 BRPM | WEIGHT: 165.79 LBS | BODY MASS INDEX: 24.56 KG/M2 | SYSTOLIC BLOOD PRESSURE: 106 MMHG | TEMPERATURE: 98.7 F | DIASTOLIC BLOOD PRESSURE: 61 MMHG

## 2024-08-29 DIAGNOSIS — R55 SYNCOPE: Primary | ICD-10-CM

## 2024-08-29 LAB
ANION GAP SERPL CALCULATED.3IONS-SCNC: 3 MMOL/L (ref 4–13)
ATRIAL RATE: 74 BPM
BASOPHILS # BLD AUTO: 0.04 THOUSANDS/ÂΜL (ref 0–0.1)
BASOPHILS NFR BLD AUTO: 1 % (ref 0–1)
BILIRUB UR QL STRIP: NEGATIVE
BUN SERPL-MCNC: 8 MG/DL (ref 5–25)
CALCIUM SERPL-MCNC: 8.4 MG/DL (ref 8.4–10.2)
CARDIAC TROPONIN I PNL SERPL HS: <2 NG/L
CHLORIDE SERPL-SCNC: 106 MMOL/L (ref 96–108)
CLARITY UR: CLEAR
CO2 SERPL-SCNC: 31 MMOL/L (ref 21–32)
COLOR UR: YELLOW
CREAT SERPL-MCNC: 0.76 MG/DL (ref 0.6–1.3)
EOSINOPHIL # BLD AUTO: 0.02 THOUSAND/ÂΜL (ref 0–0.61)
EOSINOPHIL NFR BLD AUTO: 0 % (ref 0–6)
ERYTHROCYTE [DISTWIDTH] IN BLOOD BY AUTOMATED COUNT: 12.6 % (ref 11.6–15.1)
EXT PREGNANCY TEST URINE: NEGATIVE
EXT. CONTROL: NORMAL
GFR SERPL CREATININE-BSD FRML MDRD: 106 ML/MIN/1.73SQ M
GLUCOSE SERPL-MCNC: 92 MG/DL (ref 65–140)
GLUCOSE UR STRIP-MCNC: NEGATIVE MG/DL
HCT VFR BLD AUTO: 39 % (ref 34.8–46.1)
HGB BLD-MCNC: 13.3 G/DL (ref 11.5–15.4)
HGB UR QL STRIP.AUTO: NEGATIVE
IMM GRANULOCYTES # BLD AUTO: 0.02 THOUSAND/UL (ref 0–0.2)
IMM GRANULOCYTES NFR BLD AUTO: 0 % (ref 0–2)
KETONES UR STRIP-MCNC: NEGATIVE MG/DL
LEUKOCYTE ESTERASE UR QL STRIP: ABNORMAL
LYMPHOCYTES # BLD AUTO: 1.89 THOUSANDS/ÂΜL (ref 0.6–4.47)
LYMPHOCYTES NFR BLD AUTO: 30 % (ref 14–44)
MCH RBC QN AUTO: 33.8 PG (ref 26.8–34.3)
MCHC RBC AUTO-ENTMCNC: 34.1 G/DL (ref 31.4–37.4)
MCV RBC AUTO: 99 FL (ref 82–98)
MONOCYTES # BLD AUTO: 0.2 THOUSAND/ÂΜL (ref 0.17–1.22)
MONOCYTES NFR BLD AUTO: 3 % (ref 4–12)
NEUTROPHILS # BLD AUTO: 4.2 THOUSANDS/ÂΜL (ref 1.85–7.62)
NEUTS SEG NFR BLD AUTO: 66 % (ref 43–75)
NITRITE UR QL STRIP: NEGATIVE
NRBC BLD AUTO-RTO: 0 /100 WBCS
P AXIS: 59 DEGREES
PH UR STRIP.AUTO: 7 [PH] (ref 4.5–8)
PLATELET # BLD AUTO: 263 THOUSANDS/UL (ref 149–390)
PMV BLD AUTO: 9.6 FL (ref 8.9–12.7)
POTASSIUM SERPL-SCNC: 3.6 MMOL/L (ref 3.5–5.3)
PR INTERVAL: 140 MS
PROT UR STRIP-MCNC: ABNORMAL MG/DL
QRS AXIS: 43 DEGREES
QRSD INTERVAL: 84 MS
QT INTERVAL: 370 MS
QTC INTERVAL: 410 MS
RBC # BLD AUTO: 3.93 MILLION/UL (ref 3.81–5.12)
SODIUM SERPL-SCNC: 140 MMOL/L (ref 135–147)
SP GR UR STRIP.AUTO: 1.02 (ref 1–1.03)
T WAVE AXIS: 36 DEGREES
UROBILINOGEN UR QL STRIP.AUTO: 1 E.U./DL
VENTRICULAR RATE: 74 BPM
WBC # BLD AUTO: 6.37 THOUSAND/UL (ref 4.31–10.16)

## 2024-08-29 PROCEDURE — 80048 BASIC METABOLIC PNL TOTAL CA: CPT | Performed by: EMERGENCY MEDICINE

## 2024-08-29 PROCEDURE — 96374 THER/PROPH/DIAG INJ IV PUSH: CPT

## 2024-08-29 PROCEDURE — 85025 COMPLETE CBC W/AUTO DIFF WBC: CPT | Performed by: EMERGENCY MEDICINE

## 2024-08-29 PROCEDURE — 96361 HYDRATE IV INFUSION ADD-ON: CPT

## 2024-08-29 PROCEDURE — 36415 COLL VENOUS BLD VENIPUNCTURE: CPT | Performed by: EMERGENCY MEDICINE

## 2024-08-29 PROCEDURE — 93005 ELECTROCARDIOGRAM TRACING: CPT

## 2024-08-29 PROCEDURE — 99285 EMERGENCY DEPT VISIT HI MDM: CPT | Performed by: EMERGENCY MEDICINE

## 2024-08-29 PROCEDURE — 81025 URINE PREGNANCY TEST: CPT | Performed by: EMERGENCY MEDICINE

## 2024-08-29 PROCEDURE — 84484 ASSAY OF TROPONIN QUANT: CPT | Performed by: EMERGENCY MEDICINE

## 2024-08-29 PROCEDURE — 93010 ELECTROCARDIOGRAM REPORT: CPT | Performed by: INTERNAL MEDICINE

## 2024-08-29 PROCEDURE — 96375 TX/PRO/DX INJ NEW DRUG ADDON: CPT

## 2024-08-29 PROCEDURE — 99284 EMERGENCY DEPT VISIT MOD MDM: CPT

## 2024-08-29 PROCEDURE — 70450 CT HEAD/BRAIN W/O DYE: CPT

## 2024-08-29 RX ORDER — METOCLOPRAMIDE HYDROCHLORIDE 5 MG/ML
10 INJECTION INTRAMUSCULAR; INTRAVENOUS ONCE
Status: COMPLETED | OUTPATIENT
Start: 2024-08-29 | End: 2024-08-29

## 2024-08-29 RX ORDER — ACETAMINOPHEN 10 MG/ML
1000 INJECTION, SOLUTION INTRAVENOUS ONCE
Status: COMPLETED | OUTPATIENT
Start: 2024-08-29 | End: 2024-08-29

## 2024-08-29 RX ADMIN — METOCLOPRAMIDE 10 MG: 5 INJECTION, SOLUTION INTRAMUSCULAR; INTRAVENOUS at 13:23

## 2024-08-29 RX ADMIN — ACETAMINOPHEN 1000 MG: 10 INJECTION INTRAVENOUS at 13:01

## 2024-08-29 RX ADMIN — SODIUM CHLORIDE 1000 ML: 0.9 INJECTION, SOLUTION INTRAVENOUS at 13:03

## 2024-08-29 NOTE — Clinical Note
Lena Ken was seen and treated in our emergency department on 8/29/2024.                Diagnosis:     Lena  may return to work on return date.    She may return on this date: 08/31/2024         If you have any questions or concerns, please don't hesitate to call.      Roger Aguilar MD    ______________________________           _______________          _______________  Hospital Representative                              Date                                Time

## 2024-08-29 NOTE — ED PROVIDER NOTES
"History  Chief Complaint   Patient presents with    Syncope     Pt was at work when she felt faint and passed out. Pt reports hitting her head while falling but unsure what she hit it off. Pt Aox4 in triage.      Lena is a pleasant 30 yo F here for evaluation of of a syncopal event that occurred earlier today.  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  She did strike her head on a metal counter on the way down.  States that she regained consciousness as coworkers were picking her up off the ground.  She reports feeling generalized weakness but denies postevent confusion.  No tongue biting or loss of bladder control.  Denies any history of similar.  Notes that she has been feeling somewhat generally fatigued for a few days.  She thinks that she may be \"dehydrated\" because she has not been drinking much water.  She reports history of bariatric surgery which was very successful.  She lost over 100 pounds but since her surgery sometimes does not eat very much.  Denies recent fevers or illnesses.  Reports normal urination without hematuria, dysuria, frequency, urgency.  Has had a normal menstrual cycle in the last month does not think she could be pregnant.      Syncope  Associated symptoms: headaches    Associated symptoms: no chest pain, no fever, no nausea, no palpitations, no shortness of breath and no vomiting        Prior to Admission Medications   Prescriptions Last Dose Informant Patient Reported? Taking?   FLUoxetine (PROzac) 10 mg capsule   Yes No   Sig: Take 10 mg by mouth daily   Patient not taking: Reported on 1/8/2024   acetaminophen (TYLENOL) 500 mg tablet   No No   Sig: Take 1 tablet (500 mg total) by mouth every 6 (six) hours as needed for mild pain or moderate pain   Patient not taking: Reported on 1/8/2024   famotidine (PEPCID) 20 mg tablet   No No   Sig: Take 1 tablet (20 mg total) by " mouth 2 (two) times a day   Patient not taking: Reported on 2024   levonorgestrel (MIRENA) 20 MCG/24HR IUD   Yes No   Si each by Intrauterine route   Patient not taking: Reported on 2024   ondansetron (ZOFRAN) 4 mg tablet   No No   Sig: Take 1 tablet (4 mg total) by mouth every 6 (six) hours   Patient not taking: Reported on 2024   ondansetron (Zofran ODT) 4 mg disintegrating tablet   No No   Sig: Take 1 tablet (4 mg total) by mouth every 6 (six) hours as needed for nausea or vomiting   Patient not taking: Reported on 2024   phentermine 37.5 MG capsule   Yes No   Sig: Take 37.5 mg by mouth every morning   Patient not taking: Reported on 2024   predniSONE 10 mg tablet   No No   Sig: Take 5 tabs po x 1 day; 4 tabs po x 1 day; 3 tabs po x 1 day; 2 tabs po x 1 day; 1 tab po x 1 day.   Patient not taking: Reported on 12/3/2019   topiramate (TOPAMAX) 25 mg tablet   Yes No   Sig: Take 25 mg by mouth daily   Patient not taking: Reported on 2024      Facility-Administered Medications: None       No past medical history on file.    Past Surgical History:   Procedure Laterality Date    BARIATRIC SURGERY         No family history on file.  I have reviewed and agree with the history as documented.    E-Cigarette/Vaping     E-Cigarette/Vaping Substances     Social History     Tobacco Use    Smoking status: Never    Smokeless tobacco: Never   Substance Use Topics    Alcohol use: Yes    Drug use: No       Review of Systems   Constitutional:  Positive for fatigue. Negative for chills and fever.   Eyes:  Negative for visual disturbance.   Respiratory:  Negative for cough and shortness of breath.    Cardiovascular:  Positive for syncope. Negative for chest pain and palpitations.   Gastrointestinal:  Negative for abdominal pain, nausea and vomiting.   Genitourinary:  Negative for dysuria and hematuria.   Musculoskeletal:  Negative for arthralgias and back pain.   Skin:  Negative for color change and rash.    Neurological:  Positive for syncope and headaches.   All other systems reviewed and are negative.      Physical Exam  Physical Exam  Vitals and nursing note reviewed.   Constitutional:       General: She is not in acute distress.     Appearance: She is well-developed.   HENT:      Head: Normocephalic.      Comments: Right occipital/parietal hematoma with mild tenderness.  No evidence of skull fracture.  Eyes:      Conjunctiva/sclera: Conjunctivae normal.   Cardiovascular:      Rate and Rhythm: Normal rate and regular rhythm.      Heart sounds: No murmur heard.  Pulmonary:      Effort: Pulmonary effort is normal. No respiratory distress.      Breath sounds: Normal breath sounds.   Abdominal:      Palpations: Abdomen is soft.      Tenderness: There is no abdominal tenderness.   Musculoskeletal:         General: No swelling.      Cervical back: Neck supple.   Skin:     General: Skin is warm and dry.      Capillary Refill: Capillary refill takes less than 2 seconds.   Neurological:      General: No focal deficit present.      Mental Status: She is alert and oriented to person, place, and time.   Psychiatric:         Mood and Affect: Mood normal.         Vital Signs  ED Triage Vitals   Temperature Pulse Respirations Blood Pressure SpO2   08/29/24 1241 08/29/24 1221 08/29/24 1221 08/29/24 1221 08/29/24 1221   98.7 °F (37.1 °C) 76 18 113/67 100 %      Temp Source Heart Rate Source Patient Position - Orthostatic VS BP Location FiO2 (%)   08/29/24 1241 08/29/24 1221 08/29/24 1221 08/29/24 1221 --   Oral Monitor Lying Right arm       Pain Score       08/29/24 1221       10 - Worst Possible Pain           Vitals:    08/29/24 1221 08/29/24 1230   BP: 113/67 116/59   Pulse: 76 71   Patient Position - Orthostatic VS: Lying          Visual Acuity      ED Medications  Medications   sodium chloride 0.9 % bolus 1,000 mL (1,000 mL Intravenous New Bag 8/29/24 1303)   metoclopramide (REGLAN) injection 10 mg (10 mg Intravenous Given  8/29/24 1323)   acetaminophen (Ofirmev) injection 1,000 mg (0 mg Intravenous Stopped 8/29/24 1316)       Diagnostic Studies  Results Reviewed       Procedure Component Value Units Date/Time    HS Troponin 0hr (reflex protocol) [[id number]]  (Normal) Collected: 08/29/24 1301    Lab Status: Final result Specimen: Blood from Arm, Right Updated: 08/29/24 1338     hs TnI 0hr <2 ng/L     HS Troponin I 2hr [[id number]]     Lab Status: No result Specimen: Blood     Basic metabolic panel [[id number]]  (Abnormal) Collected: 08/29/24 1301    Lab Status: Final result Specimen: Blood from Arm, Right Updated: 08/29/24 1331     Sodium 140 mmol/L      Potassium 3.6 mmol/L      Chloride 106 mmol/L      CO2 31 mmol/L      ANION GAP 3 mmol/L      BUN 8 mg/dL      Creatinine 0.76 mg/dL      Glucose 92 mg/dL      Calcium 8.4 mg/dL      eGFR 106 ml/min/1.73sq m     Narrative:      National Kidney Disease Foundation guidelines for Chronic Kidney Disease (CKD):     Stage 1 with normal or high GFR (GFR > 90 mL/min/1.73 square meters)    Stage 2 Mild CKD (GFR = 60-89 mL/min/1.73 square meters)    Stage 3A Moderate CKD (GFR = 45-59 mL/min/1.73 square meters)    Stage 3B Moderate CKD (GFR = 30-44 mL/min/1.73 square meters)    Stage 4 Severe CKD (GFR = 15-29 mL/min/1.73 square meters)    Stage 5 End Stage CKD (GFR <15 mL/min/1.73 square meters)  Note: GFR calculation is accurate only with a steady state creatinine    Urine Macroscopic, POC [[id number]]  (Abnormal) Collected: 08/29/24 1311    Lab Status: Final result Specimen: Urine Updated: 08/29/24 1313     Color, UA Yellow     Clarity, UA Clear     pH, UA 7.0     Leukocytes, UA Small     Nitrite, UA Negative     Protein,  (2+) mg/dl      Glucose, UA Negative mg/dl      Ketones, UA Negative mg/dl      Urobilinogen, UA 1.0 E.U./dl      Bilirubin, UA Negative     Occult Blood, UA Negative     Specific Gravity, UA 1.025    Narrative:      CLINITEK RESULT    Urine Microscopic [[id number]]  Collected: 08/29/24 1311    Lab Status: No result Specimen: Urine     CBC and differential [[id number]]  (Abnormal) Collected: 08/29/24 1301    Lab Status: Final result Specimen: Blood from Arm, Right Updated: 08/29/24 1310     WBC 6.37 Thousand/uL      RBC 3.93 Million/uL      Hemoglobin 13.3 g/dL      Hematocrit 39.0 %      MCV 99 fL      MCH 33.8 pg      MCHC 34.1 g/dL      RDW 12.6 %      MPV 9.6 fL      Platelets 263 Thousands/uL      nRBC 0 /100 WBCs      Segmented % 66 %      Immature Grans % 0 %      Lymphocytes % 30 %      Monocytes % 3 %      Eosinophils Relative 0 %      Basophils Relative 1 %      Absolute Neutrophils 4.20 Thousands/µL      Absolute Immature Grans 0.02 Thousand/uL      Absolute Lymphocytes 1.89 Thousands/µL      Absolute Monocytes 0.20 Thousand/µL      Eosinophils Absolute 0.02 Thousand/µL      Basophils Absolute 0.04 Thousands/µL     POCT pregnancy, urine [[id number]]  (Normal) Resulted: 08/29/24 1300    Lab Status: Final result Updated: 08/29/24 1300     EXT Preg Test, Ur Negative     Control Valid                   CT head without contrast   Final Result by Portillo James MD (08/29 1444)      No acute intracranial abnormality.                  Workstation performed: [id number]                    Procedures  Procedures         ED Course               HEART Risk Score      Flowsheet Row Most Recent Value   Heart Score Risk Calculator    History 0 Filed at: 08/29/2024 1442   ECG 0 Filed at: 08/29/2024 1442   Age 0 Filed at: 08/29/2024 1442   Risk Factors 0 Filed at: 08/29/2024 1442   Troponin 0 Filed at: 08/29/2024 1442   HEART Score 0 Filed at: 08/29/2024 1442                  PERC Rule for PE      Flowsheet Row Most Recent Value   PERC Rule for PE    Age >=50 0 Filed at: 08/29/2024 1442   HR >=100 0 Filed at: 08/29/2024 1442   O2 Sat on room air < 95% 0 Filed at: 08/29/2024 1442   History of PE or DVT 0 Filed at: 08/29/2024 1442   Recent trauma or surgery 0 Filed at: 08/29/2024 1442    Hemoptysis 0 Filed at: 08/29/2024 1442   Exogenous estrogen 0 Filed at: 08/29/2024 1442   Unilateral leg swelling 0 Filed at: 08/29/2024 1442   PERC Rule for PE Results 0 Filed at: 08/29/2024 1442                SBIRT 20yo+      Flowsheet Row Most Recent Value   Initial Alcohol Screen: US AUDIT-C     1. How often do you have a drink containing alcohol? 2 Filed at: 08/29/2024 1222   2. How many drinks containing alcohol do you have on a typical day you are drinking?  1 Filed at: 08/29/2024 1222   3b. FEMALE Any Age, or MALE 65+: How often do you have 4 or more drinks on one occassion? 0 Filed at: 08/29/2024 1222   Audit-C Score 3 Filed at: 08/29/2024 1222   RICK: How many times in the past year have you...    Used an illegal drug or used a prescription medication for non-medical reasons? Never Filed at: 08/29/2024 1222                      Medical Decision Making  29-year-old female here for syncopal event at work.  Had preceding prodrome of lightheadedness/blurry vision.  Now asymptomatic in ED.  Was complaining of severe headache after striking her head with syncopal event.  CT negative for fracture or intercranial bleeding.  Laboratory studies were performed to rule out significant anemia, electrolyte abnormalities, severe dehydration/acute kidney injury and were reassuring.  EKG and troponin without evidence of ACS/cardiac ischemia.  Pregnancy test negative.  PERC negative and no chest pain or shortness of breath.  Will plan to discharge with outpatient follow-up and return precautions.    Amount and/or Complexity of Data Reviewed  Labs: ordered. Decision-making details documented in ED Course.  Radiology: ordered. Decision-making details documented in ED Course.  ECG/medicine tests: ordered and independent interpretation performed. Decision-making details documented in ED Course.     Details: Normal sinus rhythm rate of 74, normal axis, normal intervals, no significant ST elevations or depressions to suggest  cardiac ischemia.    Risk  Prescription drug management.                 Disposition  Final diagnoses:   Syncope     Time reflects when diagnosis was documented in both MDM as applicable and the Disposition within this note       Time User Action Codes Description Comment    8/29/2024  2:53 PM Roger Aguilar Add [R55] Syncope           ED Disposition       ED Disposition   Discharge    Condition   Stable    Date/Time   Thu Aug 29, 2024 1442    Comment   Lena Ken discharge to home/self care.                   Follow-up Information       Follow up With Specialties Details Why Contact [facility]   [address] [address]  [phone]              Patient's Medications   Discharge Prescriptions    No medications on file       No discharge procedures on file.    PDMP Review       None            ED Provider  Electronically Signed by             Roger Aguilar MD  08/29/24 0475

## 2024-11-23 ENCOUNTER — HOSPITAL ENCOUNTER (EMERGENCY)
Facility: HOSPITAL | Age: 29
End: 2024-11-24
Attending: EMERGENCY MEDICINE
Payer: COMMERCIAL

## 2024-11-23 DIAGNOSIS — F10.10 ALCOHOL ABUSE: ICD-10-CM

## 2024-11-23 DIAGNOSIS — F32.A DEPRESSION: Primary | ICD-10-CM

## 2024-11-23 LAB
AMPHETAMINES SERPL QL SCN: NEGATIVE
BARBITURATES UR QL: NEGATIVE
BENZODIAZ UR QL: NEGATIVE
COCAINE UR QL: POSITIVE
ETHANOL EXG-MCNC: 0 MG/DL
EXT PREGNANCY TEST URINE: NEGATIVE
EXT. CONTROL: NORMAL
FENTANYL UR QL SCN: NEGATIVE
HYDROCODONE UR QL SCN: NEGATIVE
METHADONE UR QL: NEGATIVE
OPIATES UR QL SCN: NEGATIVE
OXYCODONE+OXYMORPHONE UR QL SCN: NEGATIVE
PCP UR QL: NEGATIVE
THC UR QL: POSITIVE

## 2024-11-23 PROCEDURE — 99284 EMERGENCY DEPT VISIT MOD MDM: CPT | Performed by: PHYSICIAN ASSISTANT

## 2024-11-23 PROCEDURE — 99284 EMERGENCY DEPT VISIT MOD MDM: CPT

## 2024-11-23 PROCEDURE — 81025 URINE PREGNANCY TEST: CPT | Performed by: PHYSICIAN ASSISTANT

## 2024-11-23 PROCEDURE — 80307 DRUG TEST PRSMV CHEM ANLYZR: CPT | Performed by: PHYSICIAN ASSISTANT

## 2024-11-23 PROCEDURE — 82075 ASSAY OF BREATH ETHANOL: CPT | Performed by: PHYSICIAN ASSISTANT

## 2024-11-23 RX ORDER — LORAZEPAM 1 MG/1
1 TABLET ORAL ONCE
Status: COMPLETED | OUTPATIENT
Start: 2024-11-23 | End: 2024-11-23

## 2024-11-23 RX ORDER — ACETAMINOPHEN 325 MG/1
975 TABLET ORAL ONCE
Status: COMPLETED | OUTPATIENT
Start: 2024-11-23 | End: 2024-11-23

## 2024-11-23 RX ORDER — NALTREXONE HYDROCHLORIDE 50 MG/1
50 TABLET, FILM COATED ORAL DAILY
Status: ON HOLD | COMMUNITY
End: 2024-11-29

## 2024-11-23 RX ORDER — NALTREXONE HYDROCHLORIDE 50 MG/1
50 TABLET, FILM COATED ORAL DAILY
Status: CANCELLED | OUTPATIENT
Start: 2024-11-24

## 2024-11-23 RX ADMIN — LORAZEPAM 1 MG: 1 TABLET ORAL at 22:04

## 2024-11-23 RX ADMIN — LORAZEPAM 1 MG: 1 TABLET ORAL at 11:21

## 2024-11-23 RX ADMIN — ACETAMINOPHEN 975 MG: 325 TABLET, FILM COATED ORAL at 20:23
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
## 2024-11-23 NOTE — ED NOTES
Insurance      Eligibility Verification System checked - ([phone]).  Online system / automated system indicates: active with [facility] ID # [id number]

## 2024-11-23 NOTE — ED CARE HANDOFF
[facility] Warm Handoff Outcome Note    Patient name Lena Ken  Location ED-03/ED-03 MRN [id number]  Age: 29 y.o.          Plan Type:  Warm Handoff                                                                                    Plan Date: 11/23/2024  Service:  ED Warm Handoff      Substance Use History:  Alcohol    Warm Handoff Update:  Pt 201 at [facility]    Warm Handoff Outcome: Residential  Inpatient

## 2024-11-23 NOTE — ED PROVIDER NOTES
Time reflects when diagnosis was documented in both MDM as applicable and the Disposition within this note       Time User Action Codes Description Comment    11/23/2024  2:36 PM Linda Pinedo Add [F32.A] Depression     11/23/2024  2:36 PM Linda Pinedo Add [F10.10] Alcohol abuse           ED Disposition       ED Disposition   Transfer to Behavioral Health Condition   --    Date/Time   Sat Nov 23, 2024  2:36 PM    Comment   Lena Ken should be transferred out to behavioral health and has been medically cleared.                Assessment & Plan       Medical Decision Making  29y.o female presents to the ER for psychiatric evaluation and possible detox. Patient is hypertensive and tachycardic. Will monitor. Otherwise vitals are stable. On exam, moist mucous membranes seen. Breathing is non-labored. No tachypnea or accessory muscle use. Lungs are clear. Heart is regular rhythm. Abdomen is not distended. Patient denies AH or VH. She is calm and cooperative. She denies SI or HI. Will have Crisis see patient and will speak with HOST.    1127 Spoke with Trista from Crisis. She would like to wait to see if HOST will place the patient. She will then come see the patient.    1236 Per RN, the patient told her that HOST recommend inpatient stay and she is to stay in the hospital until they find placement. Message sent to Crisis to see if they are still seeing the patient.    1424 Patient seen by Crisis. She is going to sign a 201.     1749 COCAINE URINE(!): Positive    1749 THC URINE(!): Positive    1749 EXTBreath Alcohol: 0.00    1749 PREGNANCY TEST URINE: Negative    1810     Patient signed out to Kashif Balderas PA-C awaiting placement. Patient stable.      Problems Addressed:  Alcohol abuse: acute illness or injury  Depression: acute illness or injury    Amount and/or Complexity of Data Reviewed  Independent Historian:      Details: Patient is historian  Labs: ordered. Decision-making details documented in ED  Course.    Risk  Prescription drug management.  Decision regarding hospitalization.        ED Course as of 11/23/24 1815   Sat Nov 23, 2024   1127 Spoke with Trista from [facility]. She would like to wait to see if HOST will place the patient. She will then come see the patient.   1236 Per RN, the patient told her that HOST recommend inpatient stay and she is to stay in the hospital until they find placement. Message sent to [facility] to see if they are still seeing the patient.   1424 Patient seen by [facility]. She is going to sign a 201.    1749 COCAINE URINE(!): Positive   1749 THC URINE(!): Positive   1749 EXTBreath Alcohol: 0.00   1749 PREGNANCY TEST URINE: Negative       Medications   LORazepam (ATIVAN) tablet 1 mg (1 mg Oral Given 11/23/24 1121)       ED Risk Strat Scores                                               History of Present Illness       Chief Complaint   Patient presents with    Depression     Patient requesting to talk to a counselor for anxiety/depression. Patient sees a therapist outpatient. Denies SI/HI/AH/VH.     Detox Evaluation     Patient relapsed on alcohol yesterday. C/o sweating.       Past Medical History:   Diagnosis Date    Anxiety     Depression       Past Surgical History:   Procedure Laterality Date    BARIATRIC SURGERY        History reviewed. No pertinent family history.   Social History     Tobacco Use    Smoking status: Never    Smokeless tobacco: Never   Vaping Use    Vaping status: Never Used   Substance Use Topics    Alcohol use: Yes    Drug use: No      E-Cigarette/Vaping    E-Cigarette Use Never User       E-Cigarette/Vaping Substances      I have reviewed and agree with the history as documented.     29y.o female with PMH of anxiety and depression presents to the ER for psychiatric evaluation. Patient reports being in a bad relationship recently. She reports this has triggered her and caused her to have worsening depression. She denies SI or HI. She denies AH or VH. She does not see  a psychiatrist or therapist. In the past, she has done telemedicine with a therapist but not recently. She was taking medication for depression in the past but did not like the way it made her feel so her doctor stopped the medication. She lives with her mother and son. She denies drug or tobacco use. She did recently relapse with alcohol. She states she was one week sober before relapsing. She reports drinking mixed drinks. She reports getting sweaty and anxious when she withdrawals but denies withdrawal seizures. She works at [employer]. She denies other complaints.    Patient presents with cousin, who is also and [employer] officer. Cousin states that he is concerned for the patient. He was telling me that she is very paranoid. She would think her ex was listening in through her phone when he wasn't present. When her ex would scratch his nose, she would think he was sending signals to people. She would say he was outside her house when he wasn't. She thought women at her job were messing around with him when they weren't. The cousin was very concerned for her. He said it seems like she stabilized a little more lately though. He does report she self medicates with alcohol.       History provided by:  Patient   used: No        Review of Systems   Constitutional:  Negative for activity change, appetite change, chills and fever.   HENT:  Negative for congestion, drooling, ear discharge, ear pain, facial swelling, rhinorrhea and sore throat.    Eyes:  Negative for redness.   Respiratory:  Negative for cough and shortness of breath.    Cardiovascular:  Negative for chest pain.   Gastrointestinal:  Negative for abdominal pain, diarrhea, nausea and vomiting.   Musculoskeletal:  Negative for neck stiffness.   Skin:  Negative for rash.   Allergic/Immunologic: Negative for food allergies.   Neurological:  Negative for weakness and numbness.   Psychiatric/Behavioral:  Negative for suicidal ideas. The patient is  nervous/anxious.            Objective       ED Triage Vitals   Temperature Pulse Blood Pressure Respirations SpO2 Patient Position - Orthostatic VS   11/23/24 1040 11/23/24 1044 11/23/24 1044 11/23/24 1044 11/23/24 1044 11/23/24 1044   97.5 °F (36.4 °C) (!) 107 (!) 163/108 16 100 % Sitting      Temp Source Heart Rate Source BP Location FiO2 (%) Pain Score    11/23/24 1040 11/23/24 1044 11/23/24 1044 -- 11/23/24 1044    Oral Monitor Right arm  No Pain      Vitals      Date and Time Temp Pulse SpO2 Resp BP Pain Score FACES Pain Rating User   11/23/24 1741 -- 110 99 % 18 163/73 -- -- KD   11/23/24 1443 -- 118 100 % 16 158/101 -- -- LD   11/23/24 1044 -- 107 100 % 16 163/108 No Pain -- KG   11/23/24 1040 97.5 °F (36.4 °C) -- -- -- -- -- -- KG            Physical Exam  Vitals and nursing note reviewed.   Constitutional:       General: She is not in acute distress.     Appearance: She is not toxic-appearing.   HENT:      Head: Normocephalic and atraumatic.      Mouth/Throat:      Lips: Pink. No lesions.      Mouth: Mucous membranes are moist.   Eyes:      Conjunctiva/sclera: Conjunctivae normal.   Neck:      Trachea: No tracheal deviation.   Cardiovascular:      Rate and Rhythm: Normal rate and regular rhythm.      Heart sounds: Normal heart sounds, S1 normal and S2 normal. No murmur heard.     No friction rub. No gallop.   Pulmonary:      Effort: Pulmonary effort is normal. No respiratory distress.      Breath sounds: Normal breath sounds. No decreased breath sounds, wheezing, rhonchi or rales.   Chest:      Chest wall: No tenderness.   Abdominal:      General: There is no distension.   Musculoskeletal:      Cervical back: Normal range of motion and neck supple.   Skin:     General: Skin is warm and dry.      Findings: No rash.   Neurological:      Mental Status: She is alert.      GCS: GCS eye subscore is 4. GCS verbal subscore is 5. GCS motor subscore is 6.   Psychiatric:         Attention and Perception: She does not  perceive auditory or visual hallucinations.         Mood and Affect: Mood normal.         Speech: Speech normal.         Behavior: Behavior is cooperative.         Thought Content: Thought content does not include homicidal or suicidal ideation.         Results Reviewed       Procedure Component Value Units Date/Time    Rapid drug screen, urine [[id number]]  (Abnormal) Collected: 11/23/24 1716    Lab Status: Final result Specimen: Urine, Clean Catch Updated: 11/23/24 1744     Amph/Meth UR Negative     Barbiturate Ur Negative     Benzodiazepine Urine Negative     Cocaine Urine Positive     Methadone Urine Negative     Opiate Urine Negative     PCP Ur Negative     THC Urine Positive     Oxycodone Urine Negative     Fentanyl Urine Negative     HYDROCODONE URINE Negative    Narrative:      Presumptive report. If requested, specimen will be sent to reference lab for confirmation.  FOR MEDICAL PURPOSES ONLY.   IF CONFIRMATION NEEDED PLEASE CONTACT THE LAB WITHIN 5 DAYS.    Drug Screen Cutoff Levels:  AMPHETAMINE/METHAMPHETAMINES  1000 ng/mL  BARBITURATES     200 ng/mL  BENZODIAZEPINES     200 ng/mL  COCAINE      300 ng/mL  METHADONE      300 ng/mL  OPIATES      300 ng/mL  PHENCYCLIDINE     25 ng/mL  THC       50 ng/mL  OXYCODONE      100 ng/mL  FENTANYL      5 ng/mL  HYDROCODONE     300 ng/mL    POCT pregnancy, urine [[id number]]  (Normal) Collected: 11/23/24 1722    Lab Status: Final result Updated: 11/23/24 1722     EXT Preg Test, Ur Negative     Control Valid    POCT alcohol breath test [[id number]]  (Normal) Resulted: 11/23/24 1716    Lab Status: Final result Updated: 11/23/24 1716     EXTBreath Alcohol 0.00            No orders to display       Procedures    ED Medication and Procedure Management   Prior to Admission Medications   Prescriptions Last Dose Informant Patient Reported? Taking?   FLUoxetine (PROzac) 10 mg capsule Not Taking  Yes No   Sig: Take 10 mg by mouth daily   Patient not taking: Reported on 11/23/2024    acetaminophen (TYLENOL) 500 mg tablet Not Taking  No No   Sig: Take 1 tablet (500 mg total) by mouth every 6 (six) hours as needed for mild pain or moderate pain   Patient not taking: Reported on 2024   famotidine (PEPCID) 20 mg tablet Not Taking  No No   Sig: Take 1 tablet (20 mg total) by mouth 2 (two) times a day   Patient not taking: Reported on 2024   levonorgestrel (MIRENA) 20 MCG/24HR IUD   Yes Yes   Si each by Intrauterine route   naltrexone (REVIA) 50 mg tablet Past Month  Yes Yes   Sig: Take 50 mg by mouth daily   ondansetron (ZOFRAN) 4 mg tablet Not Taking  No No   Sig: Take 1 tablet (4 mg total) by mouth every 6 (six) hours   Patient not taking: Reported on 2024   ondansetron (Zofran ODT) 4 mg disintegrating tablet Not Taking  No No   Sig: Take 1 tablet (4 mg total) by mouth every 6 (six) hours as needed for nausea or vomiting   Patient not taking: Reported on 2024   phentermine 37.5 MG capsule Not Taking  Yes No   Sig: Take 37.5 mg by mouth every morning   Patient not taking: Reported on 2024   predniSONE 10 mg tablet Not Taking  No No   Sig: Take 5 tabs po x 1 day; 4 tabs po x 1 day; 3 tabs po x 1 day; 2 tabs po x 1 day; 1 tab po x 1 day.   Patient not taking: Reported on 2024   topiramate (TOPAMAX) 25 mg tablet Not Taking  Yes No   Sig: Take 25 mg by mouth daily   Patient not taking: Reported on 2024      Facility-Administered Medications: None     Patient's Medications   Discharge Prescriptions    No medications on file     No discharge procedures on file.  ED SEPSIS DOCUMENTATION   Time reflects when diagnosis was documented in both MDM as applicable and the Disposition within this note       Time User Action Codes Description Comment    2024  2:36 PM Linda Pinedo Add [F32.A] Depression     2024  2:36 PM Linda Pinedo Add [F10.10] Alcohol abuse                  Linda Pinedo PA-C  [phone]

## 2024-11-23 NOTE — ED NOTES
Insurance Authorization for admission:   Phone call placed to Jose  Phone number: [phone].     Spoke to Marylee Y-P.     4 days approved.  Level of care: Inpatient Mental Health (201)  Review on tbd.   Authorization # to be obtained by admitting facility.        Eligibility Verification System checked - ([phone]).  Online system / automated system indicates: [facility]; [phone]

## 2024-11-24 ENCOUNTER — HOSPITAL ENCOUNTER (INPATIENT)
Facility: HOSPITAL | Age: 29
LOS: 8 days | Discharge: HOME | DRG: 751 | End: 2024-12-02
Attending: PSYCHIATRY & NEUROLOGY | Admitting: PSYCHIATRY & NEUROLOGY
Payer: COMMERCIAL

## 2024-11-24 VITALS
TEMPERATURE: 98.4 F | SYSTOLIC BLOOD PRESSURE: 150 MMHG | OXYGEN SATURATION: 99 % | WEIGHT: 157.41 LBS | RESPIRATION RATE: 18 BRPM | BODY MASS INDEX: 23.25 KG/M2 | DIASTOLIC BLOOD PRESSURE: 77 MMHG | HEART RATE: 98 BPM

## 2024-11-24 DIAGNOSIS — G47.00 INSOMNIA: ICD-10-CM

## 2024-11-24 DIAGNOSIS — F32.A DEPRESSION: ICD-10-CM

## 2024-11-24 DIAGNOSIS — F10.10 ALCOHOL ABUSE: Primary | ICD-10-CM

## 2024-11-24 DIAGNOSIS — F33.9 MAJOR DEPRESSION, RECURRENT (HCC): ICD-10-CM

## 2024-11-24 PROCEDURE — GZHZZZZ GROUP PSYCHOTHERAPY: ICD-10-PCS | Performed by: PSYCHIATRY & NEUROLOGY

## 2024-11-24 PROCEDURE — GZ59ZZZ INDIVIDUAL PSYCHOTHERAPY, PSYCHOPHYSIOLOGICAL: ICD-10-PCS | Performed by: PSYCHIATRY & NEUROLOGY

## 2024-11-24 RX ORDER — DIPHENHYDRAMINE HYDROCHLORIDE 50 MG/ML
50 INJECTION INTRAMUSCULAR; INTRAVENOUS EVERY 6 HOURS PRN
Status: DISCONTINUED | OUTPATIENT
Start: 2024-11-24 | End: 2024-12-02 | Stop reason: HOSPADM

## 2024-11-24 RX ORDER — BENZTROPINE MESYLATE 1 MG/ML
1 INJECTION, SOLUTION INTRAMUSCULAR; INTRAVENOUS 2 TIMES DAILY PRN
Status: DISCONTINUED | OUTPATIENT
Start: 2024-11-24 | End: 2024-12-02 | Stop reason: HOSPADM

## 2024-11-24 RX ORDER — OLANZAPINE 10 MG/2ML
5 INJECTION, POWDER, FOR SOLUTION INTRAMUSCULAR
Status: CANCELLED | OUTPATIENT
Start: 2024-11-24

## 2024-11-24 RX ORDER — ACETAMINOPHEN 325 MG/1
650 TABLET ORAL EVERY 6 HOURS PRN
Status: DISCONTINUED | OUTPATIENT
Start: 2024-11-24 | End: 2024-12-02 | Stop reason: HOSPADM

## 2024-11-24 RX ORDER — ACETAMINOPHEN 325 MG/1
650 TABLET ORAL EVERY 4 HOURS PRN
Status: DISCONTINUED | OUTPATIENT
Start: 2024-11-24 | End: 2024-12-02 | Stop reason: HOSPADM

## 2024-11-24 RX ORDER — HYDROXYZINE HYDROCHLORIDE 25 MG/1
100 TABLET, FILM COATED ORAL
Status: CANCELLED | OUTPATIENT
Start: 2024-11-24

## 2024-11-24 RX ORDER — OLANZAPINE 5 MG/1
5 TABLET ORAL
Status: DISCONTINUED | OUTPATIENT
Start: 2024-11-24 | End: 2024-12-02 | Stop reason: HOSPADM

## 2024-11-24 RX ORDER — BENZTROPINE MESYLATE 1 MG/1
1 TABLET ORAL 2 TIMES DAILY PRN
Status: CANCELLED | OUTPATIENT
Start: 2024-11-24

## 2024-11-24 RX ORDER — HYDROXYZINE HYDROCHLORIDE 25 MG/1
50 TABLET, FILM COATED ORAL
Status: CANCELLED | OUTPATIENT
Start: 2024-11-24

## 2024-11-24 RX ORDER — OLANZAPINE 10 MG/2ML
2.5 INJECTION, POWDER, FOR SOLUTION INTRAMUSCULAR
Status: CANCELLED | OUTPATIENT
Start: 2024-11-24

## 2024-11-24 RX ORDER — ACETAMINOPHEN 325 MG/1
650 TABLET ORAL ONCE
Status: COMPLETED | OUTPATIENT
Start: 2024-11-24 | End: 2024-11-24

## 2024-11-24 RX ORDER — MAGNESIUM HYDROXIDE/ALUMINUM HYDROXICE/SIMETHICONE 120; 1200; 1200 MG/30ML; MG/30ML; MG/30ML
30 SUSPENSION ORAL EVERY 4 HOURS PRN
Status: CANCELLED | OUTPATIENT
Start: 2024-11-24

## 2024-11-24 RX ORDER — OLANZAPINE 5 MG/1
5 TABLET ORAL
Status: CANCELLED | OUTPATIENT
Start: 2024-11-24

## 2024-11-24 RX ORDER — ACETAMINOPHEN 325 MG/1
650 TABLET ORAL EVERY 4 HOURS PRN
Status: CANCELLED | OUTPATIENT
Start: 2024-11-24

## 2024-11-24 RX ORDER — HYDROXYZINE HYDROCHLORIDE 50 MG/1
100 TABLET, FILM COATED ORAL
Status: DISCONTINUED | OUTPATIENT
Start: 2024-11-24 | End: 2024-12-02 | Stop reason: HOSPADM

## 2024-11-24 RX ORDER — AMOXICILLIN 250 MG
1 CAPSULE ORAL DAILY PRN
Status: DISCONTINUED | OUTPATIENT
Start: 2024-11-24 | End: 2024-12-02 | Stop reason: HOSPADM

## 2024-11-24 RX ORDER — LORAZEPAM 2 MG/ML
2 INJECTION INTRAMUSCULAR EVERY 6 HOURS PRN
Status: CANCELLED | OUTPATIENT
Start: 2024-11-24

## 2024-11-24 RX ORDER — HYDROXYZINE HYDROCHLORIDE 50 MG/1
50 TABLET, FILM COATED ORAL
Status: DISCONTINUED | OUTPATIENT
Start: 2024-11-24 | End: 2024-12-02 | Stop reason: HOSPADM

## 2024-11-24 RX ORDER — DIPHENHYDRAMINE HYDROCHLORIDE 50 MG/ML
50 INJECTION INTRAMUSCULAR; INTRAVENOUS EVERY 6 HOURS PRN
Status: CANCELLED | OUTPATIENT
Start: 2024-11-24

## 2024-11-24 RX ORDER — POLYETHYLENE GLYCOL 3350 17 G/17G
17 POWDER, FOR SOLUTION ORAL DAILY PRN
Status: CANCELLED | OUTPATIENT
Start: 2024-11-24

## 2024-11-24 RX ORDER — PROPRANOLOL HCL 20 MG
10 TABLET ORAL EVERY 8 HOURS PRN
Status: CANCELLED | OUTPATIENT
Start: 2024-11-24

## 2024-11-24 RX ORDER — ACETAMINOPHEN 325 MG/1
975 TABLET ORAL EVERY 6 HOURS PRN
Status: CANCELLED | OUTPATIENT
Start: 2024-11-24

## 2024-11-24 RX ORDER — HYDROXYZINE HYDROCHLORIDE 25 MG/1
25 TABLET, FILM COATED ORAL
Status: DISCONTINUED | OUTPATIENT
Start: 2024-11-24 | End: 2024-12-02 | Stop reason: HOSPADM

## 2024-11-24 RX ORDER — LORAZEPAM 2 MG/ML
2 INJECTION INTRAMUSCULAR EVERY 6 HOURS PRN
Status: DISCONTINUED | OUTPATIENT
Start: 2024-11-24 | End: 2024-12-02 | Stop reason: HOSPADM

## 2024-11-24 RX ORDER — OLANZAPINE 5 MG/1
2.5 TABLET ORAL
Status: CANCELLED | OUTPATIENT
Start: 2024-11-24

## 2024-11-24 RX ORDER — ACETAMINOPHEN 325 MG/1
975 TABLET ORAL EVERY 6 HOURS PRN
Status: DISCONTINUED | OUTPATIENT
Start: 2024-11-24 | End: 2024-12-02 | Stop reason: HOSPADM

## 2024-11-24 RX ORDER — AMOXICILLIN 250 MG
1 CAPSULE ORAL DAILY PRN
Status: CANCELLED | OUTPATIENT
Start: 2024-11-24

## 2024-11-24 RX ORDER — LORAZEPAM 1 MG/1
1 TABLET ORAL ONCE
Status: COMPLETED | OUTPATIENT
Start: 2024-11-24 | End: 2024-11-24

## 2024-11-24 RX ORDER — ACETAMINOPHEN 325 MG/1
650 TABLET ORAL EVERY 6 HOURS PRN
Status: CANCELLED | OUTPATIENT
Start: 2024-11-24

## 2024-11-24 RX ORDER — OLANZAPINE 10 MG/2ML
5 INJECTION, POWDER, FOR SOLUTION INTRAMUSCULAR
Status: DISCONTINUED | OUTPATIENT
Start: 2024-11-24 | End: 2024-12-02 | Stop reason: HOSPADM

## 2024-11-24 RX ORDER — PROPRANOLOL HYDROCHLORIDE 10 MG/1
10 TABLET ORAL EVERY 8 HOURS PRN
Status: DISCONTINUED | OUTPATIENT
Start: 2024-11-24 | End: 2024-12-02 | Stop reason: HOSPADM

## 2024-11-24 RX ORDER — HYDROXYZINE HYDROCHLORIDE 25 MG/1
25 TABLET, FILM COATED ORAL
Status: CANCELLED | OUTPATIENT
Start: 2024-11-24

## 2024-11-24 RX ORDER — BENZTROPINE MESYLATE 1 MG/ML
1 INJECTION, SOLUTION INTRAMUSCULAR; INTRAVENOUS 2 TIMES DAILY PRN
Status: CANCELLED | OUTPATIENT
Start: 2024-11-24

## 2024-11-24 RX ORDER — OLANZAPINE 10 MG/2ML
2.5 INJECTION, POWDER, FOR SOLUTION INTRAMUSCULAR
Status: DISCONTINUED | OUTPATIENT
Start: 2024-11-24 | End: 2024-12-02 | Stop reason: HOSPADM

## 2024-11-24 RX ORDER — POLYETHYLENE GLYCOL 3350 17 G/17G
17 POWDER, FOR SOLUTION ORAL DAILY PRN
Status: DISCONTINUED | OUTPATIENT
Start: 2024-11-24 | End: 2024-12-02 | Stop reason: HOSPADM

## 2024-11-24 RX ORDER — MAGNESIUM HYDROXIDE/ALUMINUM HYDROXICE/SIMETHICONE 120; 1200; 1200 MG/30ML; MG/30ML; MG/30ML
30 SUSPENSION ORAL EVERY 4 HOURS PRN
Status: DISCONTINUED | OUTPATIENT
Start: 2024-11-24 | End: 2024-12-02 | Stop reason: HOSPADM

## 2024-11-24 RX ORDER — BENZTROPINE MESYLATE 1 MG/1
1 TABLET ORAL 2 TIMES DAILY PRN
Status: DISCONTINUED | OUTPATIENT
Start: 2024-11-24 | End: 2024-12-02 | Stop reason: HOSPADM

## 2024-11-24 RX ORDER — OLANZAPINE 2.5 MG/1
2.5 TABLET, FILM COATED ORAL
Status: DISCONTINUED | OUTPATIENT
Start: 2024-11-24 | End: 2024-12-02 | Stop reason: HOSPADM

## 2024-11-24 RX ADMIN — LORAZEPAM 1 MG: 1 TABLET ORAL at 13:37

## 2024-11-24 RX ADMIN — ACETAMINOPHEN 650 MG: 325 TABLET, FILM COATED ORAL at 13:37

## 2024-11-24 RX ADMIN — MELATONIN TAB 3 MG 3 MG: 3 TAB at 21:02

## 2024-11-24 RX ADMIN — ACETAMINOPHEN 975 MG: 325 TABLET, FILM COATED ORAL at 20:06

## 2024-11-24 NOTE — EMTALA/ACUTE CARE TRANSFER
[facility] EMERGENCY DEPARTMENT  [address] [address]  Dept: [phone]      EMTALA TRANSFER CONSENT    NAME Lena Ken                                         1995                              MRN [id number]    I have been informed of my rights regarding examination, treatment, and transfer   by Dr. Lorraine Kenyon DO    Benefits: Specialized equipment and/or services available at the receiving facility (Include comment)________________________ (Pyschiatry)    Risks: Potential for delay in receiving treatment, Potential deterioration of medical condition, Increased discomfort during transfer, Possible worsening of condition or death during transfer      Consent for Transfer:  I acknowledge that my medical condition has been evaluated and explained to me by the emergency department physician or other qualified medical person and/or my attending physician, who has recommended that I be transferred to the service of  Accepting Physician: Dr. Holter at Accepting Facility Name, City & State : [facility]. The above potential benefits of such transfer, the potential risks associated with such transfer, and the probable risks of not being transferred have been explained to me, and I fully understand them.  The doctor has explained that, in my case, the benefits of transfer outweigh the risks.  I agree to be transferred.    I authorize the performance of emergency medical procedures and treatments upon me in both transit and upon arrival at the receiving facility.  Additionally, I authorize the release of any and all medical records to the receiving facility and request they be transported with me, if possible.  I understand that the safest mode of transportation during a medical emergency is an ambulance and that the Hospital advocates the use of this mode of transport. Risks of traveling to the receiving facility by car, including absence of medical control, life sustaining  equipment, such as oxygen, and medical personnel has been explained to me and I fully understand them.    (ZELDA CORRECT BOX BELOW)  [  ]  I consent to the stated transfer and to be transported by ambulance/helicopter.  [  ]  I consent to the stated transfer, but refuse transportation by ambulance and accept full responsibility for my transportation by car.  I understand the risks of non-ambulance transfers and I exonerate the Hospital and its staff from any deterioration in my condition that results from this refusal.    X___________________________________________    DATE  24  TIME________  Signature of patient or legally responsible individual signing on patient behalf           RELATIONSHIP TO PATIENT_________________________          Provider Certification    NAME Lena Ken                                        [facility] 1995                              MRN [id number]    A medical screening exam was performed on the above named patient.  Based on the examination:    Condition Necessitating Transfer The primary encounter diagnosis was Depression. A diagnosis of Alcohol abuse was also pertinent to this visit.    Patient Condition: The patient has been stabilized such that within reasonable medical probability, no material deterioration of the patient condition or the condition of the unborn child(vinita) is likely to result from the transfer    Reason for Transfer: Level of Care needed not available at this facility    Transfer Requirements: [facility]   Space available and qualified personnel available for treatment as acknowledged by    Agreed to accept transfer and to provide appropriate medical treatment as acknowledged by       Dr. Holter  Appropriate medical records of the examination and treatment of the patient are provided at the time of transfer   STAFF INITIAL WHEN COMPLETED _______  Transfer will be performed by qualified personnel from [facility]  and appropriate transfer equipment as  required, including the use of necessary and appropriate life support measures.    Provider Certification: I have examined the patient and explained the following risks and benefits of being transferred/refusing transfer to the patient/family:  General risk, such as traffic hazards, adverse weather conditions, rough terrain or turbulence, possible failure of equipment (including vehicle or aircraft), or consequences of actions of persons outside the control of the transport personnel, Unanticipated needs of medical equipment and personnel during transport, Risk of worsening condition, The possibility of a transport vehicle being unavailable, The patient is stable for psychiatric transfer because they are medically stable, and is protected from harming him/herself or others during transport      Based on these reasonable risks and benefits to the patient and/or the unborn child(vinita), and based upon the information available at the time of the patient’s examination, I certify that the medical benefits reasonably to be expected from the provision of appropriate medical treatments at another medical facility outweigh the increasing risks, if any, to the individual’s medical condition, and in the case of labor to the unborn child, from effecting the transfer.    X____________________________________________ DATE 11/24/24        TIME_______      ORIGINAL - SEND TO MEDICAL RECORDS   COPY - SEND WITH PATIENT DURING TRANSFER

## 2024-11-24 NOTE — ED CARE HANDOFF
Emergency Department Sign Out Note        Sign out and transfer of care from Lewis Tan PA-C. See Separate Emergency Department note.     The patient, Lena Ken, was evaluated by the previous provider for psychiatric evaluation.    Per initial provider note - 29y.o female with PMH of anxiety and depression presents to the ER for psychiatric evaluation. Patient reports being in a bad relationship recently. She reports this has triggered her and caused her to have worsening depression. She denies SI or HI. She denies AH or VH. She does not see a psychiatrist or therapist. In the past, she has done telemedicine with a therapist but not recently. She was taking medication for depression in the past but did not like the way it made her feel so her doctor stopped the medication. She lives with her mother and son. She denies drug or tobacco use. She did recently relapse with alcohol. She states she was one week sober before relapsing. She reports drinking mixed drinks. She reports getting sweaty and anxious when she withdrawals but denies withdrawal seizures. She works at [employer]. She denies other complaints.     Patient presents with cousin, who is also and [employer] officer. Cousin states that he is concerned for the patient. He was telling me that she is very paranoid. She would think her ex was listening in through her phone when he wasn't present. When her ex would scratch his nose, she would think he was sending signals to people. She would say he was outside her house when he wasn't. She thought women at her job were messing around with him when they weren't. The cousin was very concerned for her. He said it seems like she stabilized a little more lately though. He does report she self medicates with alcohol.     Workup Completed:  UDS  POCT urine pregnancy  POCT alcohol breath test    ED Course / Workup Pending (followup):  0600 - Per sign out, waiting for placement.    1320 - Patient accepted at [facility]. ESTELA  completed.    1510 - Patient signed out to Modesta Hubbard PA-C awaiting transfer. Patient stable.                                  ED Course as of 11/24/24 1512   Sat Nov 23, 2024   1127 Spoke with Trista from [facility]. She would like to wait to see if HOST will place the patient. She will then come see the patient.   1236 Per RN, the patient told her that HOST recommend inpatient stay and she is to stay in the hospital until they find placement. Message sent to [facility] to see if they are still seeing the patient.   1424 Patient seen by [facility]. She is going to sign a 201.    1749 COCAINE URINE(!): Positive   1749 THC URINE(!): Positive   1749 EXTBreath Alcohol: 0.00   1749 PREGNANCY TEST URINE: Negative     Procedures  Medical Decision Making  Problems Addressed:  Alcohol abuse: chronic illness or injury  Depression: chronic illness or injury    Amount and/or Complexity of Data Reviewed  Independent Historian:      Details: Patient is historian  Labs: ordered. Decision-making details documented in ED Course.    Risk  OTC drugs.  Prescription drug management.  Decision regarding hospitalization.            Disposition  Final diagnoses:   Depression   Alcohol abuse     Time reflects when diagnosis was documented in both MDM as applicable and the Disposition within this note       Time User Action Codes Description Comment    11/23/2024  2:36 PM Linda Pinedo Add [F32.A] Depression     11/23/2024  2:36 PM Linda Pinedo Add [F10.10] Alcohol abuse           ED Disposition       ED Disposition   Transfer to Behavioral Health    Condition   --    Date/Time   Sat Nov 23, 2024  2:36 PM    Comment   Lena Ken should be transferred out to behavioral health and has been medically cleared.                MD Documentation      Flowsheet Row Most Recent Value   Patient Condition The patient has been stabilized such that within reasonable medical probability, no material deterioration of the patient condition or the condition  of the unborn child(vinita) is likely to result from the transfer   Reason for Transfer Level of Care needed not available at this facility   Benefits of Transfer Specialized equipment and/or services available at the receiving facility (Include comment)________________________  [Pyschiatry]   Risks of Transfer Potential for delay in receiving treatment, Potential deterioration of medical condition, Increased discomfort during transfer, Possible worsening of condition or death during transfer   Accepting Physician Dr. Holter   Accepting Facility Name, [address]   Transported by (Company and Unit #) [employer]   Sending MD Linda LUNSFORD,  Dr. Kenyon   Provider Certification General risk, such as traffic hazards, adverse weather conditions, rough terrain or turbulence, possible failure of equipment (including vehicle or aircraft), or consequences of actions of persons outside the control of the transport personnel, Unanticipated needs of medical equipment and personnel during transport, Risk of worsening condition, The possibility of a transport vehicle being unavailable, The patient is stable for psychiatric transfer because they are medically stable, and is protected from harming him/herself or others during transport          RN Documentation      Flowsheet Row Most Recent Value   Accepting Facility Name, [address]   Transported by (Company and Unit #) [employer]          Follow-up Information    None       Patient's Medications   Discharge Prescriptions    No medications on file     No discharge procedures on file.       ED Provider  Electronically Signed by     Linda Pinedo PA-C  11/24/24 5415

## 2024-11-24 NOTE — ED NOTES
Patient is accepted at  3[facility]  Patient is accepted by Dr. Holter     Transportation is arranged with [employer].     Transportation is scheduled for 1900 with [employer]         Nurse report is to be called to [phone] prior to patient transfer.

## 2024-11-24 NOTE — ED NOTES
Pt asked if she could get her bookbag, and this RN informed pt belonging are put in reserved lockers for safety. Pt was worried about her car being parked outside her house and being towed, she wanted her keys so significant other can move the car, this RN informed pt I will make sure to give the keys to her significant other to move the car and pt agreed with the plan.      Kati Santizo RN  11/24/24 9572

## 2024-11-24 NOTE — ED NOTES
Pt will be reviewed by the treatment team this morning to determine appropriate bed availability. AM crisis to follow up with Intake.

## 2024-11-24 NOTE — ED NOTES
Pt was provided with a menu, and informed we were going to be back in soon.     Kati Santizo RN  11/24/24 [id number]

## 2024-11-24 NOTE — ED CARE HANDOFF
Emergency Department Sign Out Note        Sign out and transfer of care from Linda Pinedo PA-C. See Separate Emergency Department note.     The patient, Lena Ken, was evaluated by the previous provider for psych eval.    Workup Completed:  Results Reviewed       Procedure Component Value Units Date/Time    Rapid drug screen, urine [[id number]]  (Abnormal) Collected: 11/23/24 1716    Lab Status: Final result Specimen: Urine, Clean Catch Updated: 11/23/24 1744     Amph/Meth UR Negative     Barbiturate Ur Negative     Benzodiazepine Urine Negative     Cocaine Urine Positive     Methadone Urine Negative     Opiate Urine Negative     PCP Ur Negative     THC Urine Positive     Oxycodone Urine Negative     Fentanyl Urine Negative     HYDROCODONE URINE Negative    Narrative:      Presumptive report. If requested, specimen will be sent to reference lab for confirmation.  FOR MEDICAL PURPOSES ONLY.   IF CONFIRMATION NEEDED PLEASE CONTACT THE LAB WITHIN 5 DAYS.    Drug Screen Cutoff Levels:  AMPHETAMINE/METHAMPHETAMINES  1000 ng/mL  BARBITURATES     200 ng/mL  BENZODIAZEPINES     200 ng/mL  COCAINE      300 ng/mL  METHADONE      300 ng/mL  OPIATES      300 ng/mL  PHENCYCLIDINE     25 ng/mL  THC       50 ng/mL  OXYCODONE      100 ng/mL  FENTANYL      5 ng/mL  HYDROCODONE     300 ng/mL    POCT pregnancy, urine [[id number]]  (Normal) Collected: 11/23/24 1722    Lab Status: Final result Updated: 11/23/24 1722     EXT Preg Test, Ur Negative     Control Valid    POCT alcohol breath test [[id number]]  (Normal) Resulted: 11/23/24 1716    Lab Status: Final result Updated: 11/23/24 1716     EXTBreath Alcohol 0.00              ED Course / Workup Pending (followup):                                    ED Course as of 11/23/24 2345   Sat Nov 23, 2024   1809 S/o from [facility]: worsening depression. No SI or HI. Paranoid. Pt signed a 201. Waiting for placement. If she wants to leave she can. Relapsed on alcohol again yesterday- 1 drink.     7679 S/o to [facility]: 201 signed, pending placement      Procedures  Medical Decision Making    Signed out to Lewis Tan PA-C: remained stable under my care. Did have a headache so tylenol was given. Felt anxious so ativan was given as that is what helped her earlier.     Amount and/or Complexity of Data Reviewed  Labs: ordered.    Risk  OTC drugs.  Prescription drug management.  Decision regarding hospitalization.            Disposition  Final diagnoses:   Depression   Alcohol abuse     Time reflects when diagnosis was documented in both MDM as applicable and the Disposition within this note       Time User Action Codes Description Comment    11/23/2024  2:36 PM Linda Pinedo Add [F32.A] Depression     11/23/2024  2:36 PM Linda Pinedo Add [F10.10] Alcohol abuse           ED Disposition       ED Disposition   Transfer to [facility]    Condition   --    Date/Time   Sat Nov 23, 2024  2:36 PM    Comment   Lena Ken should be transferred out to behavioral health and has been medically cleared.                MD Documentation      Flowsheet Row Most Recent Value   Sending MD Dr. Kohler          Follow-up Information    None       Patient's Medications   Discharge Prescriptions    No medications on file     No discharge procedures on file.       ED Provider  Electronically Signed by     Kashif Balderas PA-C  11/23/24 5974

## 2024-11-25 PROBLEM — Z00.8 MEDICAL CLEARANCE FOR PSYCHIATRIC ADMISSION: Status: ACTIVE | Noted: 2024-11-25

## 2024-11-25 PROBLEM — Z91.199 NONCOMPLIANCE: Status: ACTIVE | Noted: 2024-11-25

## 2024-11-25 PROBLEM — Z90.3 H/O GASTRIC SLEEVE: Status: ACTIVE | Noted: 2024-11-25

## 2024-11-25 PROBLEM — F33.9 MAJOR DEPRESSION, RECURRENT (HCC): Status: ACTIVE | Noted: 2024-11-25

## 2024-11-25 PROBLEM — F14.10 COCAINE ABUSE (HCC): Status: ACTIVE | Noted: 2024-11-25

## 2024-11-25 PROBLEM — F10.10 ALCOHOL ABUSE: Status: ACTIVE | Noted: 2024-11-25

## 2024-11-25 PROBLEM — F12.10 MILD TETRAHYDROCANNABINOL (THC) ABUSE: Status: ACTIVE | Noted: 2024-11-25

## 2024-11-25 LAB
25(OH)D3 SERPL-MCNC: <7 NG/ML (ref 30–100)
ALBUMIN SERPL BCG-MCNC: 3.8 G/DL (ref 3.5–5)
ALP SERPL-CCNC: 59 U/L (ref 34–104)
ALT SERPL W P-5'-P-CCNC: 11 U/L (ref 7–52)
AMORPH PHOS CRY URNS QL MICRO: ABNORMAL /HPF
ANION GAP SERPL CALCULATED.3IONS-SCNC: 7 MMOL/L (ref 4–13)
AST SERPL W P-5'-P-CCNC: 14 U/L (ref 13–39)
BACTERIA UR QL AUTO: ABNORMAL /HPF
BASOPHILS # BLD AUTO: 0.05 THOUSANDS/ΜL (ref 0–0.1)
BASOPHILS NFR BLD AUTO: 1 % (ref 0–1)
BILIRUB SERPL-MCNC: 0.6 MG/DL (ref 0.2–1)
BILIRUB UR QL STRIP: NEGATIVE
BUN SERPL-MCNC: 7 MG/DL (ref 5–25)
CALCIUM SERPL-MCNC: 9.2 MG/DL (ref 8.4–10.2)
CHLORIDE SERPL-SCNC: 100 MMOL/L (ref 96–108)
CHOLEST SERPL-MCNC: 136 MG/DL (ref ?–200)
CLARITY UR: ABNORMAL
CO2 SERPL-SCNC: 33 MMOL/L (ref 21–32)
COLOR UR: ABNORMAL
CREAT SERPL-MCNC: 0.76 MG/DL (ref 0.6–1.3)
EOSINOPHIL # BLD AUTO: 0.11 THOUSAND/ΜL (ref 0–0.61)
EOSINOPHIL NFR BLD AUTO: 1 % (ref 0–6)
ERYTHROCYTE [DISTWIDTH] IN BLOOD BY AUTOMATED COUNT: 12.4 % (ref 11.6–15.1)
FOLATE SERPL-MCNC: 3.8 NG/ML
GFR SERPL CREATININE-BSD FRML MDRD: 106 ML/MIN/1.73SQ M
GLUCOSE P FAST SERPL-MCNC: 84 MG/DL (ref 65–99)
GLUCOSE SERPL-MCNC: 84 MG/DL (ref 65–140)
GLUCOSE UR STRIP-MCNC: NEGATIVE MG/DL
HCT VFR BLD AUTO: 44.9 % (ref 34.8–46.1)
HDLC SERPL-MCNC: 65 MG/DL
HGB BLD-MCNC: 15.4 G/DL (ref 11.5–15.4)
HGB UR QL STRIP.AUTO: NEGATIVE
IMM GRANULOCYTES # BLD AUTO: 0.02 THOUSAND/UL (ref 0–0.2)
IMM GRANULOCYTES NFR BLD AUTO: 0 % (ref 0–2)
KETONES UR STRIP-MCNC: ABNORMAL MG/DL
LDLC SERPL CALC-MCNC: 57 MG/DL (ref 0–100)
LEUKOCYTE ESTERASE UR QL STRIP: 500
LYMPHOCYTES # BLD AUTO: 2.5 THOUSANDS/ΜL (ref 0.6–4.47)
LYMPHOCYTES NFR BLD AUTO: 30 % (ref 14–44)
MAGNESIUM SERPL-MCNC: 2 MG/DL (ref 1.9–2.7)
MCH RBC QN AUTO: 33.7 PG (ref 26.8–34.3)
MCHC RBC AUTO-ENTMCNC: 34.3 G/DL (ref 31.4–37.4)
MCV RBC AUTO: 98 FL (ref 82–98)
MONOCYTES # BLD AUTO: 0.32 THOUSAND/ΜL (ref 0.17–1.22)
MONOCYTES NFR BLD AUTO: 4 % (ref 4–12)
MUCOUS THREADS UR QL AUTO: ABNORMAL
NEUTROPHILS # BLD AUTO: 5.25 THOUSANDS/ΜL (ref 1.85–7.62)
NEUTS SEG NFR BLD AUTO: 64 % (ref 43–75)
NITRITE UR QL STRIP: NEGATIVE
NON-SQ EPI CELLS URNS QL MICRO: ABNORMAL /HPF
NONHDLC SERPL-MCNC: 71 MG/DL
NRBC BLD AUTO-RTO: 0 /100 WBCS
PH UR STRIP.AUTO: 8 [PH]
PLATELET # BLD AUTO: 315 THOUSANDS/UL (ref 149–390)
PMV BLD AUTO: 9.4 FL (ref 8.9–12.7)
POTASSIUM SERPL-SCNC: 3.8 MMOL/L (ref 3.5–5.3)
PROT SERPL-MCNC: 7.2 G/DL (ref 6.4–8.4)
PROT UR STRIP-MCNC: ABNORMAL MG/DL
RBC # BLD AUTO: 4.57 MILLION/UL (ref 3.81–5.12)
RBC #/AREA URNS AUTO: ABNORMAL /HPF
SODIUM SERPL-SCNC: 140 MMOL/L (ref 135–147)
SP GR UR STRIP.AUTO: 1.01 (ref 1–1.04)
TREPONEMA PALLIDUM IGG+IGM AB [PRESENCE] IN SERUM OR PLASMA BY IMMUNOASSAY: NORMAL
TRIGL SERPL-MCNC: 68 MG/DL (ref ?–150)
TSH SERPL DL<=0.05 MIU/L-ACNC: 0.84 UIU/ML (ref 0.45–4.5)
UROBILINOGEN UA: 8 MG/DL
VIT B12 SERPL-MCNC: 156 PG/ML (ref 180–914)
WBC # BLD AUTO: 8.25 THOUSAND/UL (ref 4.31–10.16)
WBC #/AREA URNS AUTO: ABNORMAL /HPF

## 2024-11-25 PROCEDURE — 80053 COMPREHEN METABOLIC PANEL: CPT | Performed by: PSYCHIATRY & NEUROLOGY

## 2024-11-25 PROCEDURE — 99223 1ST HOSP IP/OBS HIGH 75: CPT | Performed by: PSYCHIATRY & NEUROLOGY

## 2024-11-25 PROCEDURE — 93005 ELECTROCARDIOGRAM TRACING: CPT

## 2024-11-25 PROCEDURE — 85025 COMPLETE CBC W/AUTO DIFF WBC: CPT | Performed by: PSYCHIATRY & NEUROLOGY

## 2024-11-25 PROCEDURE — 84443 ASSAY THYROID STIM HORMONE: CPT | Performed by: PSYCHIATRY & NEUROLOGY

## 2024-11-25 PROCEDURE — 80061 LIPID PANEL: CPT | Performed by: PSYCHIATRY & NEUROLOGY

## 2024-11-25 PROCEDURE — 81001 URINALYSIS AUTO W/SCOPE: CPT | Performed by: PSYCHIATRY & NEUROLOGY

## 2024-11-25 PROCEDURE — 86780 TREPONEMA PALLIDUM: CPT | Performed by: PSYCHIATRY & NEUROLOGY

## 2024-11-25 PROCEDURE — 99253 IP/OBS CNSLTJ NEW/EST LOW 45: CPT | Performed by: NURSE PRACTITIONER

## 2024-11-25 PROCEDURE — 83735 ASSAY OF MAGNESIUM: CPT | Performed by: PSYCHIATRY & NEUROLOGY

## 2024-11-25 PROCEDURE — 82306 VITAMIN D 25 HYDROXY: CPT | Performed by: PSYCHIATRY & NEUROLOGY

## 2024-11-25 PROCEDURE — 82607 VITAMIN B-12: CPT | Performed by: PSYCHIATRY & NEUROLOGY

## 2024-11-25 PROCEDURE — 82746 ASSAY OF FOLIC ACID SERUM: CPT | Performed by: PSYCHIATRY & NEUROLOGY

## 2024-11-25 RX ORDER — NALTREXONE HYDROCHLORIDE 50 MG/1
50 TABLET, FILM COATED ORAL DAILY
Status: DISCONTINUED | OUTPATIENT
Start: 2024-11-26 | End: 2024-12-02 | Stop reason: HOSPADM

## 2024-11-25 RX ORDER — CYANOCOBALAMIN 1000 UG/ML
1000 INJECTION, SOLUTION INTRAMUSCULAR; SUBCUTANEOUS ONCE
Status: DISCONTINUED | OUTPATIENT
Start: 2024-11-26 | End: 2024-11-28

## 2024-11-25 RX ORDER — ERGOCALCIFEROL 1.25 MG/1
50000 CAPSULE, LIQUID FILLED ORAL WEEKLY
Status: DISCONTINUED | OUTPATIENT
Start: 2024-11-25 | End: 2024-12-02 | Stop reason: HOSPADM

## 2024-11-25 RX ORDER — LANOLIN ALCOHOL/MO/W.PET/CERES
100 CREAM (GRAM) TOPICAL DAILY
Status: DISCONTINUED | OUTPATIENT
Start: 2024-11-25 | End: 2024-12-02 | Stop reason: HOSPADM

## 2024-11-25 RX ORDER — SERTRALINE HYDROCHLORIDE 25 MG/1
25 TABLET, FILM COATED ORAL DAILY
Status: COMPLETED | OUTPATIENT
Start: 2024-11-25 | End: 2024-11-25

## 2024-11-25 RX ORDER — FOLIC ACID 1 MG/1
1 TABLET ORAL DAILY
Status: DISCONTINUED | OUTPATIENT
Start: 2024-11-25 | End: 2024-12-02 | Stop reason: HOSPADM

## 2024-11-25 RX ADMIN — THIAMINE HCL TAB 100 MG 100 MG: 100 TAB at 10:48

## 2024-11-25 RX ADMIN — MELATONIN TAB 3 MG 3 MG: 3 TAB at 21:11

## 2024-11-25 RX ADMIN — SERTRALINE HYDROCHLORIDE 25 MG: 25 TABLET ORAL at 10:48

## 2024-11-25 RX ADMIN — ERGOCALCIFEROL 50000 UNITS: 1.25 CAPSULE ORAL at 17:43

## 2024-11-25 RX ADMIN — MULTIPLE VITAMINS W/ MINERALS TAB 1 TABLET: TAB ORAL at 10:48

## 2024-11-25 RX ADMIN — FOLIC ACID 1 MG: 1 TABLET ORAL at 10:48

## 2024-11-25 RX ADMIN — NICOTINE POLACRILEX 4 MG: 4 GUM, CHEWING BUCCAL at 08:13

## 2024-11-25 RX ADMIN — ACETAMINOPHEN 975 MG: 325 TABLET, FILM COATED ORAL at 10:48

## 2024-11-25 NOTE — ASSESSMENT & PLAN NOTE
Patient reports she had a gastric sleeve in 2021  Patient's BMI is 23.48  Nutrition adjustments as needed
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
## 2024-11-25 NOTE — PLAN OF CARE
Problem: DEPRESSION  Goal: Will be euthymic at discharge  Description: INTERVENTIONS:  - Administer medication as ordered  - Provide emotional support via 1:1 interaction with staff  - Encourage involvement in milieu/groups/activities  - Monitor for social isolation  Outcome: Not Progressing     Problem: ANXIETY  Goal: Will report anxiety at manageable levels  Description: INTERVENTIONS:  - Administer medication as ordered  - Teach and encourage coping skills  - Provide emotional support  - Assess patient/family for anxiety and ability to cope  Outcome: Not Progressing  Goal: By discharge: Patient will verbalize 2 strategies to deal with anxiety  Description: Interventions:  - Identify any obvious source/trigger to anxiety  - Staff will assist patient in applying identified coping technique/skills  - Encourage attendance of scheduled groups and activities  Outcome: Not Progressing

## 2024-11-25 NOTE — ASSESSMENT & PLAN NOTE
Patient has a history of alcohol abuse at least since the spring 2024  Patient received a DUI in the spring 2024  Alcohol cessation education

## 2024-11-25 NOTE — NURSING NOTE
Pt endorsing a 7/10 headache. PRN tylenol 975mg given at 20:06 and PO hydration encouraged; effective per pt. Pt states pain decreased to 3/10.

## 2024-11-25 NOTE — CMS CERTIFICATION NOTE
Certification: Based upon physical, mental and social evaluations, I certify that inpatient psychiatric services continue to be medically necessary for this patient for a duration of greater than 2 midnights for the treatment of  Major depression, recurrent (HCC) Available alternative community resources still do not meet the patient's mental health care needs. I further attest that an established written individualized plan of care has been updated and is outlined in the patient's medical records.

## 2024-11-25 NOTE — NURSING NOTE
Patient about the unit at times. Talking on the phone. Limited interaction with peers. Pleasant, calm and cooperative. No complaints. CIWA score at 1050 was 5 due to a headache and 0 at 1450.
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
## 2024-11-25 NOTE — ASSESSMENT & PLAN NOTE
Patient reports that she has been not taking her antidepressant  Please educate the patient on the importance of medication adherence as well as medical and psychological follow-up

## 2024-11-25 NOTE — ASSESSMENT & PLAN NOTE
Vital signs stable afebrile patient seen and examined by myself at the bedside labs from today were reviewed by myself sodium 140 potassium 3.8 creatinine 0.76  Patient medically stable for admit  Please reach out to [facility] with any medical questions or concerns

## 2024-11-25 NOTE — TREATMENT PLAN
TREATMENT PLAN REVIEW - Behavioral Health Lena Ken 29 y.o. 1995 female MRN: [id number]    [facility] 3B BEHAVIORAL [facility] Room / Bed: [facility] 341/[facility] 341-02 Encounter: [id number]          Admit Date/Time:  11/24/2024  7:33 PM    Treatment Team:   MD Johanna Ribera RN Tara Nickens Jennifer King Brooke Rickert    Diagnosis: Principal Problem:    Major depression, recurrent (HCC)  Active Problems:    Medical clearance for psychiatric admission    H/O gastric sleeve    Mild tetrahydrocannabinol (THC) abuse    Cocaine abuse (HCC)    Alcohol abuse    Noncompliance      Patient Strengths/Assets: ability for insight, communication skills    Patient Barriers/Limitations: financial instability, marital/family conflict    Short Term Goals: decrease in depressive symptoms, acceptance of need for psychiatric treatment    Long Term Goals: improvement in depression, acceptance of need for psychiatric treatment    Progress Towards Goals: starting psychiatric medications as prescribed    Recommended Treatment: medication management, patient medication education, group therapy, milieu therapy, continued Behavioral Health psychiatric evaluation/assessment process    Treatment Frequency: daily medication monitoring, group and milieu therapy daily, monitoring through interdisciplinary rounds, monitoring through weekly patient care conferences    Expected Discharge Date:  Greater than 2 midnights    Discharge Plan: referrals as indicated, return to previous living arrangement    Treatment Plan Created/Updated By: Yuniel Duran MD

## 2024-11-26 LAB
ATRIAL RATE: 73 BPM
P AXIS: 66 DEGREES
PR INTERVAL: 132 MS
QRS AXIS: 17 DEGREES
QRSD INTERVAL: 88 MS
QT INTERVAL: 390 MS
QTC INTERVAL: 430 MS
T WAVE AXIS: 31 DEGREES
VENTRICULAR RATE: 73 BPM

## 2024-11-26 PROCEDURE — 93010 ELECTROCARDIOGRAM REPORT: CPT | Performed by: STUDENT IN AN ORGANIZED HEALTH CARE EDUCATION/TRAINING PROGRAM

## 2024-11-26 PROCEDURE — 99232 SBSQ HOSP IP/OBS MODERATE 35: CPT | Performed by: PSYCHIATRY & NEUROLOGY

## 2024-11-26 RX ADMIN — MELATONIN TAB 3 MG 3 MG: 3 TAB at 21:04

## 2024-11-26 RX ADMIN — SERTRALINE HYDROCHLORIDE 50 MG: 50 TABLET ORAL at 08:06

## 2024-11-26 RX ADMIN — AMOXICILLIN AND CLAVULANATE POTASSIUM 1 TABLET: 875; 125 TABLET, FILM COATED ORAL at 10:55

## 2024-11-26 RX ADMIN — NALTREXONE HYDROCHLORIDE 50 MG: 50 TABLET, FILM COATED ORAL at 08:06

## 2024-11-26 RX ADMIN — THIAMINE HCL TAB 100 MG 100 MG: 100 TAB at 08:06

## 2024-11-26 RX ADMIN — MULTIPLE VITAMINS W/ MINERALS TAB 1 TABLET: TAB ORAL at 08:06

## 2024-11-26 RX ADMIN — FOLIC ACID 1 MG: 1 TABLET ORAL at 08:06

## 2024-11-26 RX ADMIN — ACETAMINOPHEN 975 MG: 325 TABLET, FILM COATED ORAL at 08:06

## 2024-11-26 RX ADMIN — AMOXICILLIN AND CLAVULANATE POTASSIUM 1 TABLET: 875; 125 TABLET, FILM COATED ORAL at 21:04

## 2024-11-26 RX ADMIN — ACETAMINOPHEN 975 MG: 325 TABLET, FILM COATED ORAL at 21:04

## 2024-11-26 NOTE — ASSESSMENT & PLAN NOTE
Patient is a 29-year-old female with past medical history significant for gastric sleeve and past psychiatric history significant for anxiety and depression as well as AUD, stimulant use disorder (cocaine), and THC use.  Patient presented to the ED secondary to concerns for possible detox as well as unstable mood.  In the ED HOST was contacted and they recommended that patient seek inpatient psych treatment prior to substance use treatment.  Patient is now admitted to  on a 201 secondary to anxiety and depression.    Patient is sad this morning.  Was able to discuss more about her internal conflicts regarding her relationship.  Endorses positive benefit of group.  Denies any side effects of her medication.  Will continue titration of Zoloft and continue to aim for discharge next Monday. Will reach out to [facility] regarding UTI    -cont zoloft 50 mg QD w/ plan to increase to 75 on Thursday. Aim for a dose of 100 to 150 mg  -Substance use treatment referral on d/c, [facility]?  -Encourage group attendance and utilization of coping skills

## 2024-11-26 NOTE — NURSING NOTE
Patient has been seclusive to her room most of the shift. Pleasant, calm and cooperative. No complaints. Compliant with medications.

## 2024-11-26 NOTE — NURSING NOTE
Patient secluded to room this shift , appears to be calm and cooperative. Patient compliant with medication and routine vitals. Denies SI/HI/AH/VH/AH at this time.

## 2024-11-26 NOTE — QUICK NOTE
Urinalysis from 11/25/2024 reviewed by myself patient had greater than 500 leukocytes and is reporting frequency in urination.  I have started her on Augmentin 875 mg p.o. twice daily x 5 days.

## 2024-11-26 NOTE — PLAN OF CARE
Problem: DEPRESSION  Goal: Will be euthymic at discharge  Description: INTERVENTIONS:  - Administer medication as ordered  - Provide emotional support via 1:1 interaction with staff  - Encourage involvement in milieu/groups/activities  - Monitor for social isolation  Outcome: Not Progressing     Problem: ANXIETY  Goal: Will report anxiety at manageable levels  Description: INTERVENTIONS:  - Administer medication as ordered  - Teach and encourage coping skills  - Provide emotional support  - Assess patient/family for anxiety and ability to cope  Outcome: Progressing  Goal: By discharge: Patient will verbalize 2 strategies to deal with anxiety  Description: Interventions:  - Identify any obvious source/trigger to anxiety  - Staff will assist patient in applying identified coping technique/skills  - Encourage attendance of scheduled groups and activities  Outcome: Not Progressing     Problem: Ineffective Coping  Goal: Participates in unit activities  Description: Interventions:  - Provide therapeutic environment   - Provide required programming   - Redirect inappropriate behaviors   Outcome: Not Progressing     Problem: DISCHARGE PLANNING - CARE MANAGEMENT  Goal: Discharge to post-acute care or home with appropriate resources  Description: INTERVENTIONS:  - Conduct assessment to determine patient/family and health care team treatment goals, and need for post-acute services based on payer coverage, community resources, and patient preferences, and barriers to discharge  - Address psychosocial, clinical, and financial barriers to discharge as identified in assessment in conjunction with the patient/family and health care team  - Arrange appropriate level of post-acute services according to patient’s   needs and preference and payer coverage in collaboration with the physician and health care team  - Communicate with and update the patient/family, physician, and health care team regarding progress on the discharge  plan  - Arrange appropriate transportation to post-acute venues  Outcome: Not Progressing

## 2024-11-26 NOTE — PROGRESS NOTES
11/25/24 1400   Team Meeting   Meeting Type Tx Team Meeting   Team Members Present   Team Members Present Physician;Nurse;   Physician Team Member Renee   [facility] Member Regi   Patient/Family Present   Patient Present Yes   Patient's Family Present No     Tx plan was reviewed and discussed with Pt. Pt was encouraged to attend groups. Medication was discussed with Pt. Pt signed tx plan.

## 2024-11-26 NOTE — PROGRESS NOTES
11/26/24 0839   Team Meeting   Meeting Type Daily Rounds   Team Members Present   Team Members Present Physician;Nurse;   Physician Team Member Renee   [facility] Team Member August   Patient/Family Present   Patient Present No   Patient's Family Present No     Pt med/meal compliant. Pleasant, calm, cooperative. Denying symptoms. Discharge possible Monday.

## 2024-11-26 NOTE — PROGRESS NOTES
Progress Note - Behavioral Health   Name: Lena Ken 29 y.o. female I MRN: [id number]  Unit/Bed#: U 341-02 I Date of Admission: 11/24/2024   Date of Service: 11/26/2024 I Hospital Day: 2     Assessment & Plan  Major depression, recurrent (HCC)  Patient is a 29-year-old female with past medical history significant for gastric sleeve and past psychiatric history significant for anxiety and depression as well as AUD, stimulant use disorder (cocaine), and THC use.  Patient presented to the ED secondary to concerns for possible detox as well as unstable mood.  In the ED HOST was contacted and they recommended that patient seek inpatient psych treatment prior to substance use treatment.  Patient is now admitted to  on a 201 secondary to anxiety and depression.    Patient is sad this morning.  Was able to discuss more about her internal conflicts regarding her relationship.  Endorses positive benefit of group.  Denies any side effects of her medication.  Will continue titration of Zoloft and continue to aim for discharge next Monday. Will reach out to [facility] regarding UTI    -cont zoloft 50 mg QD w/ plan to increase to 75 on Thursday. Aim for a dose of 100 to 150 mg  -Substance use treatment referral on d/c, [facility]?  -Encourage group attendance and utilization of coping skills    Alcohol abuse  -CIWA  -thiamine and folate vitamin supplementation  -VS stable  Cocaine abuse (HCC)  -sub use treatment referral on d/c  Mild tetrahydrocannabinol (THC) abuse  -sub use treatment referral on d/c  Medical clearance for psychiatric admission    H/O gastric sleeve  -B12 and vitamin D supplementation  Noncompliance        Plan     Recommended Treatment:    - Encourage early mobility and having a structured day  - Provide frequent re-orientation, and cognitive stimulation  - Ensure assistive devices are in proper working order (eye-glasses, hearing aids)  - Encourage adequate hydration, nutrition and monitor bowel  movements  - Maintain sleep-wake cycle: Uninterrupted sleep time; low-level lighting at night  - Fall precaution  - f/u [facility] recs regarding the medical problems   - Continue medication titration and treatment plan; adjust medication to optimize treatment response and as clinically indicated. .   - Observation: routine            - VS: as per unit protocol  - Diet: Regular diet  - Encourage group attendance and milieu therapy  - Dispo: To be determined     Scheduled medications:  Current Facility-Administered Medications   Medication Dose Route Frequency Provider Last Rate    acetaminophen  650 mg Oral Q6H PRN Tere Harach, DO      acetaminophen  650 mg Oral Q4H PRN Tere Harach, DO      acetaminophen  975 mg Oral Q6H PRN Tere Harach, DO      aluminum-magnesium hydroxide-simethicone  30 mL Oral Q4H PRN Tere Harach, DO      benztropine  1 mg Intramuscular BID PRN Tere Harach, DO      benztropine  1 mg Oral BID PRN Tere Harach, DO      [START ON 11/28/2024] cyanocobalamin  1,000 mcg Oral Daily EMILY Pickard      cyanocobalamin  1,000 mcg Intramuscular Once EMILY Pickard      hydrOXYzine HCL  50 mg Oral Q6H PRN Max 4/day Tere Harach, DO      Or    diphenhydrAMINE  50 mg Intramuscular Q6H PRN Tere Harach, DO      ergocalciferol  50,000 Units Oral Weekly EMILY Pickard      folic acid  1 mg Oral Daily Yuniel Duran MD      hydrOXYzine HCL  100 mg Oral Q6H PRN Max 4/day Tere Harach, DO      Or    LORazepam  2 mg Intramuscular Q6H PRN Tere Harach, DO      hydrOXYzine HCL  25 mg Oral Q6H PRN Max 4/day Tere Harach, DO      melatonin  3 mg Oral HS Tere Harach, DO      multivitamin-minerals  1 tablet Oral Daily Yuniel Duran MD      naltrexone  50 mg Oral Daily Yuniel Duran MD      nicotine polacrilex  4 mg Oral Q2H PRN Keri Delcid MD      OLANZapine  5 mg Oral Q4H PRN Max 3/day Tere Harach, DO      Or    OLANZapine  2.5 mg Intramuscular Q4H PRN Max 3/day Tere Harach, DO       OLANZapine  5 mg Oral Q3H PRN Max 3/day Tere Rodriguezach, DO      Or    OLANZapine  5 mg Intramuscular Q3H PRN Max 3/day Tere Rodriguezach, DO      OLANZapine  2.5 mg Oral Q4H PRN Max 6/day Tere Rodriguezach, DO      polyethylene glycol  17 g Oral Daily PRN Tere Harach, DO      propranolol  10 mg Oral Q8H PRN Tere Harach, DO      senna-docusate sodium  1 tablet Oral Daily PRN Tere Rodriguezach, DO      sertraline  50 mg Oral Daily Yuniel Duran MD      Thiamine Mononitrate  100 mg Oral Daily Yuniel Duran MD        PRN:    acetaminophen    acetaminophen    acetaminophen    aluminum-magnesium hydroxide-simethicone    benztropine    benztropine    hydrOXYzine HCL **OR** diphenhydrAMINE    hydrOXYzine HCL **OR** LORazepam    hydrOXYzine HCL    nicotine polacrilex    OLANZapine **OR** OLANZapine    OLANZapine **OR** OLANZapine    OLANZapine    polyethylene glycol    propranolol    senna-docusate sodium       Subjective     Patient was visited on unit for continuing care; chart reviewed and discussed with multidisciplinary treatment team.  On approach, the patient was calm and cooperative.  Patient reports feeling sad today secondary to ending relationship with her partner.  Patient notes that it has been hard to separate from him after 2 years.  Discussed with patient coping skills as well as the difficulties of discovering who she is outside the context of a long-term relationship.  Patient notes that she had a good phone call with her son this morning.  Patient reports groups being helpful.  No problem initiating and maintaining sleep.  Denied A/VH currently.  Denied SI/HI, intent or plan upon direct inquiry at this time.    Labs reviewed as well as urinalysis.  Patient denies any pain with urination but does note increased frequency.  Will reach out to [facility].    Patient continues to be visible in the milieu and interacts with staff and peers. No reports of aggression or self-injurious behavior on unit. No PRN medications used in  "the past 24 hours.    Patient accepted all offered medications and no adverse effects of medications noted or reported.    Objective    Current Mental Status Evaluation:  Appearance: casually dressed, consistent with stated age  Motor: no psychomotor retardation, no gait abnormalities  Behavior: cooperative, answers questions appropriately  Speech: soft, normal rhythm  Mood: \"sad\"  Affect: congruent, full range  Thought Process: linear and goal-oriented  Thought Content: denies delusions  Risk Potential: denies suicidal ideation, plan, or intent. Denies homicidal ideation  Perceptions: denies auditory hallucinations, denies visual hallucinations,   Sensorium: Oriented to person, place, time, and situation  Cognition: cognitive ability appears intact but was not quantitatively tested  Consciousness: alert and awake  Attention: intact, able to focus without difficulty  Insight: fair  Judgement: limited            Vital signs in last 24 hours:    Temp:  [96.5 °F (35.8 °C)-97.9 °F (36.6 °C)] 96.5 °F (35.8 °C)  HR:  [70-87] 79  BP: (133-160)/(75-96) 147/84  Resp:  [16] 16  SpO2:  [96 %-100 %] 99 %  O2 Device: None (Room air)    Psychiatric Review of Systems:  Medication adverse effects: none  Sleep: unchanged  Appetite: unchanged  Behaviors over the past 24 hours: unchanged    Laboratory results:    I have personally reviewed all pertinent laboratory/tests results  Recent Results (from the past 48 hours)   Comprehensive metabolic panel    Collection Time: 11/25/24  6:12 AM   Result Value Ref Range    Sodium 140 135 - 147 mmol/L    Potassium 3.8 3.5 - 5.3 mmol/L    Chloride 100 96 - 108 mmol/L    CO2 33 (H) 21 - 32 mmol/L    ANION GAP 7 4 - 13 mmol/L    BUN 7 5 - 25 mg/dL    Creatinine 0.76 0.60 - 1.30 mg/dL    Glucose 84 65 - 140 mg/dL    Glucose, Fasting 84 65 - 99 mg/dL    Calcium 9.2 8.4 - 10.2 mg/dL    AST 14 13 - 39 U/L    ALT 11 7 - 52 U/L    Alkaline Phosphatase 59 34 - 104 U/L    Total Protein 7.2 6.4 - 8.4 g/dL "    Albumin 3.8 3.5 - 5.0 g/dL    Total Bilirubin 0.60 0.20 - 1.00 mg/dL    eGFR 106 ml/min/1.73sq m   CBC and differential    Collection Time: 11/25/24  6:12 AM   Result Value Ref Range    WBC 8.25 4.31 - 10.16 Thousand/uL    RBC 4.57 3.81 - 5.12 Million/uL    Hemoglobin 15.4 11.5 - 15.4 g/dL    Hematocrit 44.9 34.8 - 46.1 %    MCV 98 82 - 98 fL    MCH 33.7 26.8 - 34.3 pg    MCHC 34.3 31.4 - 37.4 g/dL    RDW 12.4 11.6 - 15.1 %    MPV 9.4 8.9 - 12.7 fL    Platelets 315 149 - 390 Thousands/uL    nRBC 0 /100 WBCs    Segmented % 64 43 - 75 %    Immature Grans % 0 0 - 2 %    Lymphocytes % 30 14 - 44 %    Monocytes % 4 4 - 12 %    Eosinophils Relative 1 0 - 6 %    Basophils Relative 1 0 - 1 %    Absolute Neutrophils 5.25 1.85 - 7.62 Thousands/µL    Absolute Immature Grans 0.02 0.00 - 0.20 Thousand/uL    Absolute Lymphocytes 2.50 0.60 - 4.47 Thousands/µL    Absolute Monocytes 0.32 0.17 - 1.22 Thousand/µL    Eosinophils Absolute 0.11 0.00 - 0.61 Thousand/µL    Basophils Absolute 0.05 0.00 - 0.10 Thousands/µL   TSH, 3rd generation with Free T4 reflex    Collection Time: 11/25/24  6:12 AM   Result Value Ref Range    TSH 3RD GENERATON 0.840 0.450 - 4.500 uIU/mL   Vitamin B12    Collection Time: 11/25/24  6:12 AM   Result Value Ref Range    Vitamin B-12 156 (L) 180 - 914 pg/mL   Folate    Collection Time: 11/25/24  6:12 AM   Result Value Ref Range    Folate 3.8 (L) >5.9 ng/mL   Vitamin D 25 hydroxy    Collection Time: 11/25/24  6:12 AM   Result Value Ref Range    Vit D, 25-Hydroxy <7.0 (L) 30.0 - 100.0 ng/mL   Lipid panel    Collection Time: 11/25/24  6:12 AM   Result Value Ref Range    Cholesterol 136 See Comment mg/dL    Triglycerides 68 See Comment mg/dL    HDL, Direct 65 >=50 mg/dL    LDL Calculated 57 0 - 100 mg/dL    Non-HDL-Chol (CHOL-HDL) 71 mg/dl   Total Syphilis (IgG/IgM) Screening    Collection Time: 11/25/24  6:12 AM   Result Value Ref Range    Syphilis Total Antibody Non-reactive Non-Reactive   Magnesium     Collection Time: 11/25/24  6:12 AM   Result Value Ref Range    Magnesium 2.0 1.9 - 2.7 mg/dL   Urinalysis    Collection Time: 11/25/24 11:14 AM   Result Value Ref Range    Clarity, UA Slightly Cloudy (A) Clear, Other    Color, UA Kristy (A) Straw, Yellow, Pale Yellow    Specific Gravity, UA 1.015 1.003 - 1.040    pH, UA 8.0 4.5, 5.0, 5.5, 6.0, 6.5, 7.0, 7.5, 8.0    Glucose, UA Negative Negative mg/dl    Ketones, UA 5 (Trace) (A) Negative mg/dl    Occult Blood, UA Negative Negative    Protein, UA 30 (1+) (A) Negative mg/dl    Nitrite, UA Negative Negative    Bilirubin, UA Negative Negative    Leukocytes, .0 (A) Negative    WBC, UA 20-30 (A) None Seen, 2-4, 5-60 /hpf    RBC, UA 0-1 (A) None Seen, 2-4 /hpf    Bacteria, UA Innumerable (A) None Seen, Occasional /hpf    AMORPH PHOSPATES Moderate /hpf    Epithelial Cells Moderate (A) None Seen, Occasional /hpf    MUCUS THREADS Moderate (A) None Seen    UROBILINOGEN UA 8.0 (A) 1.0, Negative mg/dL   ECG 12 lead    Collection Time: 11/25/24  5:07 PM   Result Value Ref Range    Ventricular Rate 73 BPM    Atrial Rate 73 BPM    GA Interval 132 ms    QRSD Interval 88 ms    QT Interval 390 ms    QTC Interval 430 ms    P Axis 66 degrees    QRS Axis 17 degrees    T Wave Axis 31 degrees          Progress Toward Goals & Illness Status:   progressing, attends groups, participates in milieu therapy    Patient is not at goal. They are not yet ready for discharge. The patient's condition currently requires active psychopharmacological medication management, interdisciplinary coordination with case management, and the utilization of adjunctive milieu and group therapy to augment psychopharmacological efficacy. The patient's risk of morbidity, and progression or decompensation of psychiatric disease, is higher without this current treatment.     Next of Kin  Extended Emergency Contact Information  Primary Emergency Contact: Pina Isaac   [place] [facility]  Mobile Phone:  [phone]  Relation: Sister  Secondary Emergency Contact: Sara Ken  Mobile Phone: [phone]  Relation: Mother  Preferred language: Bolivian   needed? Yes    Counseling / Coordination of Care  Patient's progress discussed with staff in treatment team meeting.  Medications, treatment progress and treatment plan reviewed with patient.  Medication education provided to patient.  Educated on importance of medication and treatment compliance.  Supportive therapy provided to patient.  Cognitive techniques utilized during the session.  Reassurance and supportive therapy provided.  Reoriented to reality and reassured.  Encouraged participation in milieu and group therapy on the unit.  Crisis/safety plan discussed with patient.       Yuniel Duran MD  Attending Psychiatrist   [facility]      This note has been constructed using a voice recognition system. There may be translation, syntax, or grammatical errors. If you have any questions, please contact the dictating provider.

## 2024-11-26 NOTE — NURSING NOTE
"Pt reported \"pounding\" headache and mild perspiration,  CIWA score 5 at 1630. and 4 at 2000, which is distinguish almost entirely due her reported HA but also reported barely visible perspiration that she says \"gets  worse\" overnight\" Pt finally agreed to take a PRN for her \"pounding headache\" at 2104 and is now currently asleep in her room without reporting any unmet needs.  Pt jonathan HI/SI/AVH,  is pleasant during staff interaction and compliant with all scheduled medications.  Pt is dressed in hospital attire, pending cpersonal clothing currently being laundered.   "

## 2024-11-26 NOTE — PROGRESS NOTES
11/26/24 1417   Team Meeting   Meeting Type Tx Team Meeting   Team Members Present   Team Members Present Physician;Nurse;   Physician Team Member Renee   [facility] Team Member Regi   Patient/Family Present   Patient Present Yes   Patient's Family Present No     Tx plan was reviewed and discussed with Pt. Pt was encouraged to attend groups. Medication was discussed with Pt. Pt signed tx plan.
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
## 2024-11-27 PROBLEM — N39.0 UTI (URINARY TRACT INFECTION): Status: ACTIVE | Noted: 2024-11-27

## 2024-11-27 PROCEDURE — 99232 SBSQ HOSP IP/OBS MODERATE 35: CPT | Performed by: PSYCHIATRY & NEUROLOGY

## 2024-11-27 RX ORDER — SERTRALINE HYDROCHLORIDE 100 MG/1
100 TABLET, FILM COATED ORAL DAILY
Status: DISCONTINUED | OUTPATIENT
Start: 2024-11-28 | End: 2024-12-02 | Stop reason: HOSPADM

## 2024-11-27 RX ORDER — ONDANSETRON 4 MG/1
4 TABLET, ORALLY DISINTEGRATING ORAL EVERY 6 HOURS PRN
Status: DISCONTINUED | OUTPATIENT
Start: 2024-11-27 | End: 2024-12-02 | Stop reason: HOSPADM

## 2024-11-27 RX ADMIN — NICOTINE POLACRILEX 4 MG: 4 GUM, CHEWING BUCCAL at 11:39

## 2024-11-27 RX ADMIN — SENNOSIDES AND DOCUSATE SODIUM 1 TABLET: 50; 8.6 TABLET ORAL at 14:15

## 2024-11-27 RX ADMIN — NICOTINE POLACRILEX 4 MG: 4 GUM, CHEWING BUCCAL at 20:39

## 2024-11-27 RX ADMIN — NICOTINE POLACRILEX 4 MG: 4 GUM, CHEWING BUCCAL at 18:08

## 2024-11-27 RX ADMIN — MELATONIN TAB 3 MG 3 MG: 3 TAB at 21:00

## 2024-11-27 RX ADMIN — NICOTINE POLACRILEX 4 MG: 4 GUM, CHEWING BUCCAL at 14:02

## 2024-11-27 RX ADMIN — POLYETHYLENE GLYCOL 3350 17 G: 17 POWDER, FOR SOLUTION ORAL at 16:09

## 2024-11-27 RX ADMIN — THIAMINE HCL TAB 100 MG 100 MG: 100 TAB at 08:20

## 2024-11-27 RX ADMIN — SERTRALINE HYDROCHLORIDE 50 MG: 50 TABLET ORAL at 08:20

## 2024-11-27 RX ADMIN — MULTIPLE VITAMINS W/ MINERALS TAB 1 TABLET: TAB ORAL at 08:20

## 2024-11-27 RX ADMIN — NALTREXONE HYDROCHLORIDE 50 MG: 50 TABLET, FILM COATED ORAL at 08:20

## 2024-11-27 RX ADMIN — ACETAMINOPHEN 975 MG: 325 TABLET, FILM COATED ORAL at 08:20

## 2024-11-27 RX ADMIN — NICOTINE POLACRILEX 4 MG: 4 GUM, CHEWING BUCCAL at 16:00

## 2024-11-27 RX ADMIN — FOLIC ACID 1 MG: 1 TABLET ORAL at 08:20

## 2024-11-27 RX ADMIN — AMOXICILLIN AND CLAVULANATE POTASSIUM 1 TABLET: 875; 125 TABLET, FILM COATED ORAL at 20:39

## 2024-11-27 RX ADMIN — AMOXICILLIN AND CLAVULANATE POTASSIUM 1 TABLET: 875; 125 TABLET, FILM COATED ORAL at 08:20

## 2024-11-27 NOTE — NURSING NOTE
Visible intermittently otherwise seclusive to room. In bed resting. Occasionally interacting with peers. Denies SI/HI, denies hallucinations. No complaints. Compliant with medications. Looking forward to discharge on Monday.

## 2024-11-27 NOTE — ASSESSMENT & PLAN NOTE
Patient is a 29-year-old female with past medical history significant for gastric sleeve and past psychiatric history significant for anxiety and depression as well as AUD, stimulant use disorder (cocaine), and THC use.  Patient presented to the ED secondary to concerns for possible detox as well as unstable mood.  In the ED HOST was contacted and they recommended that patient seek inpatient psych treatment prior to substance use treatment.  Patient is now admitted to  on a 201 secondary to anxiety and depression.    Patient reports mood is steadily improving.  Continues to have headache that is helped with Tylenol.  Will offer Zofran for nausea.  Will continue to uptitrate Zoloft. D/c pending Monday.    -increase zoloft to 100 mg QD   -Substance use treatment referral on d/c, [facility]?  -Encourage group attendance and utilization of coping skills

## 2024-11-27 NOTE — PROGRESS NOTES
Progress Note - Behavioral Health   Name: Lena Ken 29 y.o. female I MRN: [id number]  Unit/Bed#: U 341-02 I Date of Admission: 11/24/2024   Date of Service: 11/27/2024 I Hospital Day: 3     Assessment & Plan  Major depression, recurrent (HCC)  Patient is a 29-year-old female with past medical history significant for gastric sleeve and past psychiatric history significant for anxiety and depression as well as AUD, stimulant use disorder (cocaine), and THC use.  Patient presented to the ED secondary to concerns for possible detox as well as unstable mood.  In the ED HOST was contacted and they recommended that patient seek inpatient psych treatment prior to substance use treatment.  Patient is now admitted to  on a 201 secondary to anxiety and depression.    Patient reports mood is steadily improving.  Continues to have headache that is helped with Tylenol.  Will offer Zofran for nausea.  Will continue to uptitrate Zoloft. D/c pending Monday.    -increase zoloft to 100 mg QD   -Substance use treatment referral on d/c, [facility]?  -Encourage group attendance and utilization of coping skills    Alcohol abuse  -CIWA  -thiamine and folate vitamin supplementation  -VS stable  Cocaine abuse (HCC)  -sub use treatment referral on d/c  Mild tetrahydrocannabinol (THC) abuse  -sub use treatment referral on d/c  Medical clearance for psychiatric admission    H/O gastric sleeve  -B12 and vitamin D supplementation  Noncompliance    UTI (urinary tract infection)  -SLIM aware  -started Augmentin 875 mg Q12  for 5 days      Plan     Recommended Treatment:    - Encourage early mobility and having a structured day  - Provide frequent re-orientation, and cognitive stimulation  - Ensure assistive devices are in proper working order (eye-glasses, hearing aids)  - Encourage adequate hydration, nutrition and monitor bowel movements  - Maintain sleep-wake cycle: Uninterrupted sleep time; low-level lighting at night  - Fall  precaution  - f/u SLIM recs regarding the medical problems   - Continue medication titration and treatment plan; adjust medication to optimize treatment response and as clinically indicated. .   - Observation: routine            - VS: as per unit protocol  - Diet: Regular diet  - Encourage group attendance and milieu therapy  - Dispo: To be determined     Scheduled medications:  Current Facility-Administered Medications   Medication Dose Route Frequency Provider Last Rate    acetaminophen  650 mg Oral Q6H PRN Tere Harach, DO      acetaminophen  650 mg Oral Q4H PRN Tere Harach, DO      acetaminophen  975 mg Oral Q6H PRN Tere Harach, DO      aluminum-magnesium hydroxide-simethicone  30 mL Oral Q4H PRN Tere Harach, DO      amoxicillin-clavulanate  1 tablet Oral Q12H [facility] EMILY Pickard      benztropine  1 mg Intramuscular BID PRN Tere Harach, DO      benztropine  1 mg Oral BID PRN Tere Harach, DO      [START ON 11/28/2024] cyanocobalamin  1,000 mcg Oral Daily EMILY Pickard      cyanocobalamin  1,000 mcg Intramuscular Once EMILY Pickard      hydrOXYzine HCL  50 mg Oral Q6H PRN Max 4/day Tere Harach, DO      Or    diphenhydrAMINE  50 mg Intramuscular Q6H PRN Tere Harach, DO      ergocalciferol  50,000 Units Oral Weekly EMILY Pickard      folic acid  1 mg Oral Daily Yuniel Duran MD      hydrOXYzine HCL  100 mg Oral Q6H PRN Max 4/day Tere Harach, DO      Or    LORazepam  2 mg Intramuscular Q6H PRN Tere Harach, DO      hydrOXYzine HCL  25 mg Oral Q6H PRN Max 4/day Tere Harach, DO      melatonin  3 mg Oral HS Tere Harach, DO      multivitamin-minerals  1 tablet Oral Daily Yuniel Duran MD      naltrexone  50 mg Oral Daily Yuniel Duran MD      nicotine polacrilex  4 mg Oral Q2H PRN Keri Delcid MD      OLANZapine  5 mg Oral Q4H PRN Max 3/day Tere Harach, DO      Or    OLANZapine  2.5 mg Intramuscular Q4H PRN Max 3/day Tere Harach, DO      OLANZapine  5 mg  Oral Q3H PRN Max 3/day Tere Harach, DO      Or    OLANZapine  5 mg Intramuscular Q3H PRN Max 3/day Tere Harach, DO      OLANZapine  2.5 mg Oral Q4H PRN Max 6/day Tere Harach, DO      polyethylene glycol  17 g Oral Daily PRN Tere Harach, DO      propranolol  10 mg Oral Q8H PRN Tere Harach, DO      senna-docusate sodium  1 tablet Oral Daily PRN Tere Harach, DO      sertraline  50 mg Oral Daily Yuniel Duran MD      Thiamine Mononitrate  100 mg Oral Daily Yuniel Duran MD        PRN:    acetaminophen    acetaminophen    acetaminophen    aluminum-magnesium hydroxide-simethicone    benztropine    benztropine    hydrOXYzine HCL **OR** diphenhydrAMINE    hydrOXYzine HCL **OR** LORazepam    hydrOXYzine HCL    nicotine polacrilex    OLANZapine **OR** OLANZapine    OLANZapine **OR** OLANZapine    OLANZapine    polyethylene glycol    propranolol    senna-docusate sodium       Subjective     Patient was visited on unit for continuing care; chart reviewed and discussed with multidisciplinary treatment team.  On approach, the patient was calm and cooperative. Denied any changes in mood, appetite, and energy level.  Continues to endorse having a headache that she states is improving.  Has some nausea.  Overall feels mood is doing well and denies any side effects from the medication.  Patient aware of UTI and that she has been started on antibiotics for 5 days.  No problem initiating and maintaining sleep.  Denied A/VH currently.  Denied SI/HI, intent or plan upon direct inquiry at this time.    Patient continues to be visible in the milieu and interacts with staff and peers. No reports of aggression or self-injurious behavior on unit. No PRN medications used in the past 24 hours.    Patient accepted all offered medications and no adverse effects of medications noted or reported.    Objective    Current Mental Status Evaluation:  Mental Status Exam  Appearance: casually dressed, consistent with stated age  Motor: no  "psychomotor retardation  Behavior: cooperative, answers questions appropriately  Speech: soft, normal rhythm  Mood: \"better\"  Affect: constricted, less depressed-appearing  Thought Process: linear and goal-oriented  Thought Content: denies delusions  Risk Potential: denies suicidal ideation, plan, or intent. Denies homicidal ideation  Perceptions: denies auditory hallucinations, denies visual hallucinations,   Sensorium: Oriented to person, place, time, and situation  Cognition: cognitive ability appears intact but was not quantitatively tested  Consciousness: alert and awake  Attention: intact, able to focus without difficulty  Insight: fair  Judgement: limited          Vital signs in last 24 hours:    Temp:  [97 °F (36.1 °C)-97.6 °F (36.4 °C)] 97 °F (36.1 °C)  HR:  [64-91] 87  BP: (134-151)/(77-89) 151/77  SpO2:  [96 %-98 %] 96 %  O2 Device: None (Room air)    Psychiatric Review of Systems:  Medication adverse effects: none  Sleep: unchanged  Appetite: unchanged  Behaviors over the past 24 hours: unchanged    Laboratory results:    I have personally reviewed all pertinent laboratory/tests results  Recent Results (from the past 48 hours)   Urinalysis    Collection Time: 11/25/24 11:14 AM   Result Value Ref Range    Clarity, UA Slightly Cloudy (A) Clear, Other    Color, UA Kristy (A) Straw, Yellow, Pale Yellow    Specific Gravity, UA 1.015 1.003 - 1.040    pH, UA 8.0 4.5, 5.0, 5.5, 6.0, 6.5, 7.0, 7.5, 8.0    Glucose, UA Negative Negative mg/dl    Ketones, UA 5 (Trace) (A) Negative mg/dl    Occult Blood, UA Negative Negative    Protein, UA 30 (1+) (A) Negative mg/dl    Nitrite, UA Negative Negative    Bilirubin, UA Negative Negative    Leukocytes, .0 (A) Negative    WBC, UA 20-30 (A) None Seen, 2-4, 5-60 /hpf    RBC, UA 0-1 (A) None Seen, 2-4 /hpf    Bacteria, UA Innumerable (A) None Seen, Occasional /hpf    AMORPH PHOSPATES Moderate /hpf    Epithelial Cells Moderate (A) None Seen, Occasional /hpf    MUCUS " THREADS Moderate (A) None Seen    UROBILINOGEN UA 8.0 (A) 1.0, Negative mg/dL   ECG 12 lead    Collection Time: 11/25/24  5:07 PM   Result Value Ref Range    Ventricular Rate 73 BPM    Atrial Rate 73 BPM    PA Interval 132 ms    QRSD Interval 88 ms    QT Interval 390 ms    QTC Interval 430 ms    P Axis 66 degrees    QRS Axis 17 degrees    T Wave Axis 31 degrees          Progress Toward Goals & Illness Status:   progressing, participates in milieu therapy, mood is stabilizing    Patient is not at goal. They are not yet ready for discharge. The patient's condition currently requires active psychopharmacological medication management, interdisciplinary coordination with case management, and the utilization of adjunctive milieu and group therapy to augment psychopharmacological efficacy. The patient's risk of morbidity, and progression or decompensation of psychiatric disease, is higher without this current treatment.     Next of Kin  Extended Emergency Contact Information  Primary Emergency Contact: Pina Isaac   [place]  Mobile Phone: [phone]  Relation: Sister  Secondary Emergency Contact: Sara Ken  Mobile Phone: [phone]  Relation: Mother  Preferred language: Bulgarian   needed? Yes    Counseling / Coordination of Care  Patient's progress discussed with staff in treatment team meeting.  Medications, treatment progress and treatment plan reviewed with patient.  Medication education provided to patient.  Educated on importance of medication and treatment compliance.  Supportive therapy provided to patient.  Cognitive techniques utilized during the session.  Reassurance and supportive therapy provided.  Reoriented to reality and reassured.  Encouraged participation in milieu and group therapy on the unit.  Crisis/safety plan discussed with patient.       Yuniel Duran MD  Attending Psychiatrist   [facility]      This note has been constructed using  a voice recognition system. There may be translation, syntax, or grammatical errors. If you have any questions, please contact the dictating provider.

## 2024-11-27 NOTE — PROGRESS NOTES
11/27/24 0841   Team Meeting   Meeting Type Daily Rounds   Team Members Present   Team Members Present Physician;Nurse;   Physician Team Member Renee   [facility]   Care Management Team Member August   Patient/Family Present   Patient Present No   Patient's Family Present No     Pt remains on CIWA, complaining of nausea. Started on antibiotic yesterday for UTI. Zoloft increased to 100mg. Discharge Monday.

## 2024-11-27 NOTE — PLAN OF CARE
Problem: DEPRESSION  Goal: Will be euthymic at discharge  Description: INTERVENTIONS:  - Administer medication as ordered  - Provide emotional support via 1:1 interaction with staff  - Encourage involvement in milieu/groups/activities  - Monitor for social isolation  Outcome: Progressing     Problem: ANXIETY  Goal: Will report anxiety at manageable levels  Description: INTERVENTIONS:  - Administer medication as ordered  - Teach and encourage coping skills  - Provide emotional support  - Assess patient/family for anxiety and ability to cope  Outcome: Progressing  Goal: By discharge: Patient will verbalize 2 strategies to deal with anxiety  Description: Interventions:  - Identify any obvious source/trigger to anxiety  - Staff will assist patient in applying identified coping technique/skills  - Encourage attendance of scheduled groups and activities  Outcome: Progressing     Problem: DISCHARGE PLANNING - CARE MANAGEMENT  Goal: Discharge to post-acute care or home with appropriate resources  Description: INTERVENTIONS:  - Conduct assessment to determine patient/family and health care team treatment goals, and need for post-acute services based on payer coverage, community resources, and patient preferences, and barriers to discharge  - Address psychosocial, clinical, and financial barriers to discharge as identified in assessment in conjunction with the patient/family and health care team  - Arrange appropriate level of post-acute services according to patient’s   needs and preference and payer coverage in collaboration with the physician and health care team  - Communicate with and update the patient/family, physician, and health care team regarding progress on the discharge plan  - Arrange appropriate transportation to post-acute venues  Outcome: Progressing

## 2024-11-27 NOTE — SOCIAL WORK
Discussed with patient: AUDIT score of 13 UDS/Identified Substance(s) used: Cocaine and THC  Risks discussed included: Housing, relationships, legal   Recommendations discussed: IP CANDE treatment  Patient's response: Pt reported she is not interested in IP Tx at this time and requested OP CANDE treatment.

## 2024-11-28 PROCEDURE — 99232 SBSQ HOSP IP/OBS MODERATE 35: CPT | Performed by: PSYCHIATRY & NEUROLOGY

## 2024-11-28 RX ORDER — SENNOSIDES 8.6 MG
1 TABLET ORAL
Status: DISCONTINUED | OUTPATIENT
Start: 2024-11-28 | End: 2024-12-02 | Stop reason: HOSPADM

## 2024-11-28 RX ORDER — POLYETHYLENE GLYCOL 3350 17 G/17G
17 POWDER, FOR SOLUTION ORAL DAILY
Status: DISCONTINUED | OUTPATIENT
Start: 2024-11-28 | End: 2024-12-02 | Stop reason: HOSPADM

## 2024-11-28 RX ADMIN — THIAMINE HCL TAB 100 MG 100 MG: 100 TAB at 08:22

## 2024-11-28 RX ADMIN — ACETAMINOPHEN 650 MG: 325 TABLET, FILM COATED ORAL at 07:52

## 2024-11-28 RX ADMIN — NICOTINE POLACRILEX 4 MG: 4 GUM, CHEWING BUCCAL at 15:45

## 2024-11-28 RX ADMIN — NICOTINE POLACRILEX 4 MG: 4 GUM, CHEWING BUCCAL at 08:22

## 2024-11-28 RX ADMIN — MELATONIN TAB 3 MG 3 MG: 3 TAB at 21:03

## 2024-11-28 RX ADMIN — AMOXICILLIN AND CLAVULANATE POTASSIUM 1 TABLET: 875; 125 TABLET, FILM COATED ORAL at 21:03

## 2024-11-28 RX ADMIN — NALTREXONE HYDROCHLORIDE 50 MG: 50 TABLET, FILM COATED ORAL at 08:20

## 2024-11-28 RX ADMIN — NICOTINE POLACRILEX 4 MG: 4 GUM, CHEWING BUCCAL at 12:45

## 2024-11-28 RX ADMIN — CYANOCOBALAMIN TAB 1000 MCG 1000 MCG: 1000 TAB at 08:20

## 2024-11-28 RX ADMIN — FOLIC ACID 1 MG: 1 TABLET ORAL at 08:20

## 2024-11-28 RX ADMIN — SENNOSIDES 8.6 MG: 8.6 TABLET, FILM COATED ORAL at 21:03

## 2024-11-28 RX ADMIN — SERTRALINE HYDROCHLORIDE 100 MG: 100 TABLET ORAL at 08:20

## 2024-11-28 RX ADMIN — NICOTINE POLACRILEX 4 MG: 4 GUM, CHEWING BUCCAL at 20:54

## 2024-11-28 RX ADMIN — NICOTINE POLACRILEX 4 MG: 4 GUM, CHEWING BUCCAL at 18:25

## 2024-11-28 RX ADMIN — AMOXICILLIN AND CLAVULANATE POTASSIUM 1 TABLET: 875; 125 TABLET, FILM COATED ORAL at 08:20

## 2024-11-28 RX ADMIN — MULTIPLE VITAMINS W/ MINERALS TAB 1 TABLET: TAB ORAL at 08:20

## 2024-11-28 RX ADMIN — POLYETHYLENE GLYCOL 3350 17 G: 17 POWDER, FOR SOLUTION ORAL at 12:45

## 2024-11-28 RX ADMIN — ACETAMINOPHEN 975 MG: 325 TABLET, FILM COATED ORAL at 18:25

## 2024-11-28 RX ADMIN — NICOTINE POLACRILEX 4 MG: 4 GUM, CHEWING BUCCAL at 10:35

## 2024-11-28 NOTE — ASSESSMENT & PLAN NOTE
Patient is a 29-year-old female with past medical history significant for gastric sleeve and past psychiatric history significant for anxiety and depression as well as AUD, stimulant use disorder (cocaine), and THC use.  Patient presented to the ED secondary to concerns for possible detox as well as unstable mood.  In the ED HOST was contacted and they recommended that patient seek inpatient psych treatment prior to substance use treatment.  Patient is now admitted to  on a 201 secondary to anxiety and depression.    Patient reports mood is steadily improving.  Continues to have headache that is helped with Tylenol.  Will offer Zofran for nausea.  Will continue to uptitrate Zoloft. D/c pending Monday.    -Continue Zoloft  100 mg QD   -Substance use treatment referral on d/c, [facility]?  -Encourage group attendance and utilization of coping skills

## 2024-11-28 NOTE — PROGRESS NOTES
Progress Note - Behavioral Health   Name: Lena Ken 29 y.o. female I MRN: [id number]  Unit/Bed#: U 341-02 I Date of Admission: 11/24/2024   Date of Service: 11/28/2024 I Hospital Day: 4     Assessment & Plan  Major depression, recurrent (HCC)  Patient is a 29-year-old female with past medical history significant for gastric sleeve and past psychiatric history significant for anxiety and depression as well as AUD, stimulant use disorder (cocaine), and THC use.  Patient presented to the ED secondary to concerns for possible detox as well as unstable mood.  In the ED HOST was contacted and they recommended that patient seek inpatient psych treatment prior to substance use treatment.  Patient is now admitted to  on a 201 secondary to anxiety and depression.    Patient reports mood is steadily improving.  Continues to have headache that is helped with Tylenol.  Will offer Zofran for nausea.  Will continue to uptitrate Zoloft. D/c pending Monday.    -Continue Zoloft  100 mg QD   -Substance use treatment referral on d/c, [facility]?  -Encourage group attendance and utilization of coping skills    Alcohol abuse  -CIWA  -thiamine and folate vitamin supplementation  -VS stable  Cocaine abuse (HCC)  -sub use treatment referral on d/c  Mild tetrahydrocannabinol (THC) abuse  -sub use treatment referral on d/c  Medical clearance for psychiatric admission    H/O gastric sleeve  -B12 and vitamin D supplementation  Noncompliance    UTI (urinary tract infection)  -SLIM aware  -started Augmentin 875 mg Q12  for 5 days    Progress Toward Goals: Patient remains compliant with medications.. According to staff report patient makes needs known and is compliant medications . Visible in personal attire by choice and engaged with others socially. Attending groups.   She stated she is having constipation after starting Sertraline but otherwise has tolerated dose increase to 100 mg po qam. Denies SI or HI and denies A/V  hallucinations. Mood is less depressed and stated she is looking forward to discharge this Monday.  Continue antibiotics for UTI.    Recommended Treatment: Continue with group therapy, milieu therapy and occupational therapy.      Risks, benefits and possible side effects of Medications:   Risks, benefits, and possible side effects of medications explained to patient and patient verbalizes understanding.      History of Present Illness   Behavior over the last 24 hours:  unchanged  Sleep: normal  Appetite: normal  Medication side effects: No  ROS: no complaints        Objective   Mental Status Evaluation:  Appearance:  age appropriate and casually dressed   Behavior:  cooperative   Speech:  normal pitch and normal volume   Mood:  constricted   Affect:  constricted   Thought Process:  goal directed and logical   Associations: intact associations   Thought Content:  normal   Perceptual Disturbances: None   Risk Potential: Suicidal Ideations none  Homicidal Ideations none  Potential for Aggression No   Sensorium:  person, place, time/date, and situation   Memory:  recent and remote memory grossly intact   Consciousness:  alert and awake    Attention: attention span and concentration were age appropriate   Insight:  limited   Judgment: limited   Gait/Station: normal gait/station and normal balance   Motor Activity: no abnormal movements     Medications: all current active meds have been reviewed, continue current psychiatric medications, and current meds:   Current Facility-Administered Medications:     acetaminophen (TYLENOL) tablet 650 mg, Q6H PRN    acetaminophen (TYLENOL) tablet 650 mg, Q4H PRN    acetaminophen (TYLENOL) tablet 975 mg, Q6H PRN    aluminum-magnesium hydroxide-simethicone (MAALOX) oral suspension 30 mL, Q4H PRN    amoxicillin-clavulanate (AUGMENTIN) 875-125 mg per tablet 1 tablet, Q12H ALEXX    benztropine (COGENTIN) injection 1 mg, BID PRN    benztropine (COGENTIN) tablet 1 mg, BID PRN     cyanocobalamin (VITAMIN B-12) tablet 1,000 mcg, Daily    cyanocobalamin injection 1,000 mcg, Once    hydrOXYzine HCL (ATARAX) tablet 50 mg, Q6H PRN Max 4/day **OR** diphenhydrAMINE (BENADRYL) injection 50 mg, Q6H PRN    ergocalciferol (VITAMIN D2) capsule 50,000 Units, Weekly    folic acid (FOLVITE) tablet 1 mg, Daily    hydrOXYzine HCL (ATARAX) tablet 100 mg, Q6H PRN Max 4/day **OR** LORazepam (ATIVAN) injection 2 mg, Q6H PRN    hydrOXYzine HCL (ATARAX) tablet 25 mg, Q6H PRN Max 4/day    melatonin tablet 3 mg, HS    multivitamin-minerals (CENTRUM) tablet 1 tablet, Daily    naltrexone (REVIA) tablet 50 mg, Daily    nicotine polacrilex (NICORETTE) gum 4 mg, Q2H PRN    OLANZapine (ZyPREXA) tablet 5 mg, Q4H PRN Max 3/day **OR** OLANZapine (ZyPREXA) IM injection 2.5 mg, Q4H PRN Max 3/day    OLANZapine (ZyPREXA) tablet 5 mg, Q3H PRN Max 3/day **OR** OLANZapine (ZyPREXA) IM injection 5 mg, Q3H PRN Max 3/day    OLANZapine (ZyPREXA) tablet 2.5 mg, Q4H PRN Max 6/day    ondansetron (ZOFRAN-ODT) dispersible tablet 4 mg, Q6H PRN    polyethylene glycol (MIRALAX) packet 17 g, Daily PRN    propranolol (INDERAL) tablet 10 mg, Q8H PRN    senna-docusate sodium (SENOKOT S) 8.6-50 mg per tablet 1 tablet, Daily PRN    sertraline (ZOLOFT) tablet 100 mg, Daily    thiamine tablet 100 mg, Daily.      Lab Results: I have reviewed the following results:  Most Recent Labs:   Lab Results   Component Value Date    WBC 8.25 11/25/2024    RBC 4.57 11/25/2024    HGB 15.4 11/25/2024    HCT 44.9 11/25/2024     11/25/2024    RDW 12.4 11/25/2024    NEUTROABS 5.25 11/25/2024    SODIUM 140 11/25/2024    K 3.8 11/25/2024     11/25/2024    CO2 33 (H) 11/25/2024    BUN 7 11/25/2024    CREATININE 0.76 11/25/2024    GLUC 84 11/25/2024    GLUF 84 11/25/2024    CALCIUM 9.2 11/25/2024    AST 14 11/25/2024    ALT 11 11/25/2024    ALKPHOS 59 11/25/2024    TP 7.2 11/25/2024    ALB 3.8 11/25/2024    TBILI 0.60 11/25/2024    CHOLESTEROL 136 11/25/2024     HDL 65 11/25/2024    TRIG 68 11/25/2024    LDLCALC 57 11/25/2024    NONHDLC 71 11/25/2024    [id number] 0.840 11/25/2024    HGBA1C 5.2 02/17/2022     02/17/2022

## 2024-11-28 NOTE — NURSING NOTE
Pt verbally denies SI, HI and A/V hallucinations. Pt appears pleasant on approach. Makes needs known. Compliant with AM meds. Q15min rounds performed by RN. Visible in personal attire by choice. Content is unremarkable. Engaged with others socially. Attending groups. Reality based.

## 2024-11-28 NOTE — ASSESSMENT & PLAN NOTE
-B12 and vitamin D supplementation   Goal Outcome Evaluation:        Problem: Adult Behavioral Health Plan of Care  Goal: Patient-Specific Goal (Individualization)  Outcome: Ongoing, Progressing  Flowsheets  Taken 12/19/2022 1605  Patient-Specific Goals (Include Timeframe): Identify 2-3 coping skills, address relapse prevention methods, complete aftercare plans, and deny SI/HI prior to discharge.  Individualized Care Needs: Therapist to offer 1-4 therapy sessions, aftercare planning, safety planning, family education, group therapy, and brief CBT/MI interventions.  Anxieties, Fears or Concerns: none verbalized  Taken 12/19/2022 0922  Patient Personal Strengths:  • ability to maintain sobriety  • expressive of emotions  • expressive of needs  • family/social support  • independent living skills  • insight into illness/situation  • intellectual cognitive skills  • positive vocational history  • positive educational history  • motivated for treatment  • resilient  • resourceful  • self-awareness  • self-reliant  • socioeconomic stability  • spiritual/Mandaen support  • stable living environment  • tolerant  • realistic evaluation of current/future capabilities  Patient Vulnerabilities:  • adverse childhood experience(s)  • family/relationship conflict  • history of unsuccessful treatment  • legal concerns  • substance abuse/addiction  • poor impulse control  Goal: Optimized Coping Skills in Response to Life Stressors  Outcome: Ongoing, Progressing  Flowsheets (Taken 12/19/2022 1605)  Optimized Coping Skills in Response to Life Stressors: making progress toward outcome  Intervention: Promote Effective Coping Strategies  Flowsheets (Taken 12/19/2022 1605)  Supportive Measures:  • active listening utilized  • positive reinforcement provided  • counseling provided  • decision-making supported  • goal-setting facilitated  • verbalization of feelings encouraged  • self-responsibility promoted  Goal: Develops/Participates in Therapeutic [place] to Support  Successful Transition  Outcome: Ongoing, Progressing  Flowsheets (Taken 12/19/2022 1605)  Develops/Participates in Therapeutic [place] to Support Successful Transition: making progress toward outcome  Intervention: Foster Therapeutic [place]  Flowsheets (Taken 12/19/2022 1605)  Trust Relationship/Rapport:  • care explained  • reassurance provided  • choices provided  • thoughts/feelings acknowledged  • emotional support provided  • empathic listening provided  • questions answered  • questions encouraged  Intervention: Mutually Develop Transition Plan  Flowsheets  Taken 12/20/2022 1044  Discharge Coordination/Progress: Patient has [employer]. Patient plans to return home at discharge, scheduled at [facility] for aftercare, family transport.  Transportation Anticipated: family or friend will provide  Transportation Concerns: none  Current Discharge Risk:  • substance use/abuse  • psychiatric illness  • legal concerns  Concerns to be Addressed:  • mental health  • medication  • substance/tobacco abuse/use  • coping/stress  Readmission Within the Last 30 Days: no previous admission in last 30 days  Patient/Family Anticipated Services at Transition: mental health services  Patient's Choice of Community Agency(s): [facility] and [facility]  Patient/Family Anticipates Transition to: home  Offered/Gave Vendor List: no  Taken 12/19/2022 1605  Outpatient/Agency/Support Group Needs:  • outpatient psychiatric care (specify)  • outpatient medication management  • outpatient substance abuse treatment (specify)  • outpatient counseling  Transition Support:  • follow-up care discussed  • follow-up care coordinated  • crisis management plan verbalized  • crisis management plan promoted  • community resources reviewed  Anticipated Discharge Disposition: home or self-care      DATA:  Therapist met with Patient individually this date. Patient  agreeable to discuss current treatment progress and discharge concerns.         CLINICAL MANUVERING/INTERVENTIONS:  Assisted Patient in processing session content; acknowledged and normalized Patient’s thoughts, feelings, and concerns by utilizing a person-centered approach in efforts to build appropriate rapport and a positive therapeutic relationship with open and honest communication. Allowed Patient to ventilate regarding current stressors and triggers for negative emotions and thoughts in a safe nonjudgmental environment with unconditional positive regard, active listening skills, and empathy. Therapist implemented motivational interviewing techniques to assist Patient with exploring personal growth and change and discussed distress tolerance skills, self soothing techniques, and applied cognitive behavioral strategies to facilitate identification of maladaptive patterns of thinking and behavior.Therapist utilized dialectical behavior techniques to teach and model emotional regulation and relaxation methods. Therapist assisted Patient with identifying and implementing healthier coping strategies. Therapist assisted Patient with safety planning; Patient agreed to continue honest communication with Treatment Team while inpatient and identify any SI/HI.  Patient encouraged to seek nearest ER or contact 911 if danger to self or others post discharge.     ASSESSMENT:    Therapist met with patient on this date, patient continues to receive treatment for detox. Patient denies current SI/HI/AVH. Patient reports some improvement in withdrawal symptoms after starting Suboxone. Plan for discharge tomorrow per patient preference. Patient is scheduled at [facility] for aftercare. Therapist will attempt to contact patient's  and workplace again on this date, per patient request. Patient's family to provide transportation home.     10:53  Spoke with patient's   Valorie Tate, informed of patient's admission and expected discharge date/discharge plan. Patient will need to contact  upon discharge.     10:58  Therapist spoke with Andre Hyde in Human Resources at patient's workplace, requesting work excuse be emailed to [email or web address]. Therapist will send work excuse at this time.     PLAN:   Patient will continue stabilization. Patient will continue to receive services offered by Treatment Team.     Assistance with Transportation will not be needed. Family member will provide. [facility] will provide.             Therapist recommends [facility] inpatient rehab. Patient scheduled at [facility] and [facility] in [place] for outpatient services. Possible discharge tomorrow 12/21/22.

## 2024-11-29 PROCEDURE — 99232 SBSQ HOSP IP/OBS MODERATE 35: CPT | Performed by: PSYCHIATRY & NEUROLOGY

## 2024-11-29 RX ADMIN — MELATONIN TAB 3 MG 3 MG: 3 TAB at 21:23

## 2024-11-29 RX ADMIN — CYANOCOBALAMIN TAB 1000 MCG 1000 MCG: 1000 TAB at 08:19

## 2024-11-29 RX ADMIN — FOLIC ACID 1 MG: 1 TABLET ORAL at 08:19

## 2024-11-29 RX ADMIN — NICOTINE POLACRILEX 4 MG: 4 GUM, CHEWING BUCCAL at 15:49

## 2024-11-29 RX ADMIN — NALTREXONE HYDROCHLORIDE 50 MG: 50 TABLET, FILM COATED ORAL at 08:19

## 2024-11-29 RX ADMIN — NICOTINE POLACRILEX 4 MG: 4 GUM, CHEWING BUCCAL at 18:50

## 2024-11-29 RX ADMIN — AMOXICILLIN AND CLAVULANATE POTASSIUM 1 TABLET: 875; 125 TABLET, FILM COATED ORAL at 08:19

## 2024-11-29 RX ADMIN — SENNOSIDES 8.6 MG: 8.6 TABLET, FILM COATED ORAL at 21:23

## 2024-11-29 RX ADMIN — NICOTINE POLACRILEX 4 MG: 4 GUM, CHEWING BUCCAL at 21:23

## 2024-11-29 RX ADMIN — THIAMINE HCL TAB 100 MG 100 MG: 100 TAB at 08:19

## 2024-11-29 RX ADMIN — AMOXICILLIN AND CLAVULANATE POTASSIUM 1 TABLET: 875; 125 TABLET, FILM COATED ORAL at 21:23

## 2024-11-29 RX ADMIN — SERTRALINE HYDROCHLORIDE 100 MG: 100 TABLET ORAL at 08:19

## 2024-11-29 RX ADMIN — ACETAMINOPHEN 975 MG: 325 TABLET, FILM COATED ORAL at 15:48

## 2024-11-29 RX ADMIN — MULTIPLE VITAMINS W/ MINERALS TAB 1 TABLET: TAB ORAL at 08:19

## 2024-11-29 NOTE — NURSING NOTE
Visible, interacting with select peers. Pleasant, calm and cooperative. No complaints. Denies symptoms. Hopeful for discharge soon.

## 2024-11-29 NOTE — PROGRESS NOTES
Progress Note - Behavioral Health   Name: Lena Ken 29 y.o. female I MRN: [id number]  Unit/Bed#: U 341-02 I Date of Admission: 11/24/2024   Date of Service: 11/29/2024 I Hospital Day: 5     Assessment & Plan  Major depression, recurrent (HCC)  Patient is a 29-year-old female with past medical history significant for gastric sleeve and past psychiatric history significant for anxiety and depression as well as AUD, stimulant use disorder (cocaine), and THC use.  Patient presented to the ED secondary to concerns for possible detox as well as unstable mood.  In the ED HOST was contacted and they recommended that patient seek inpatient psych treatment prior to substance use treatment.  Patient is now admitted to  on a 201 secondary to anxiety and depression.    Patient seen this morning in her room.  She endorses that mood is improving with Zoloft.  Thus far no issues with tolerating higher dose of Zoloft at 100 mg.  Will continue observe response over the weekend but likely this will be stabilizing dose and discharge will be on Monday.    -Continue Zoloft  100 mg QD   -Substance use treatment referral on d/c, [facility]?  -Encourage group attendance and utilization of coping skills    Alcohol abuse  -CIWA d/c'd today given no clinical indication  -thiamine and folate vitamin supplementation  -VS stable  -cont naltrexone 50  Cocaine abuse (HCC)  -sub use treatment referral on d/c  Mild tetrahydrocannabinol (THC) abuse  -sub use treatment referral on d/c  Medical clearance for psychiatric admission    H/O gastric sleeve  -B12 and vitamin D supplementation  Noncompliance    UTI (urinary tract infection)  -SLIM aware  -started Augmentin 875 mg Q12  for 5 days, course will be completed tomorrow 11/30      Plan     Recommended Treatment:    - Encourage early mobility and having a structured day  - Provide frequent re-orientation, and cognitive stimulation  - Ensure assistive devices are in proper working order  (eye-glasses, hearing aids)  - Encourage adequate hydration, nutrition and monitor bowel movements  - Maintain sleep-wake cycle: Uninterrupted sleep time; low-level lighting at night  - Fall precaution  - f/u SLIM recs regarding the medical problems   - Continue medication titration and treatment plan; adjust medication to optimize treatment response and as clinically indicated. .   - Observation: routine            - VS: as per unit protocol  - Diet: Regular diet  - Encourage group attendance and milieu therapy  - Dispo: To be determined     Scheduled medications:  Current Facility-Administered Medications   Medication Dose Route Frequency Provider Last Rate    acetaminophen  650 mg Oral Q6H PRN Tere Harach, DO      acetaminophen  650 mg Oral Q4H PRN Tere Harach, DO      acetaminophen  975 mg Oral Q6H PRN Tere Harach, DO      aluminum-magnesium hydroxide-simethicone  30 mL Oral Q4H PRN Tere Harach, DO      amoxicillin-clavulanate  1 tablet Oral Q12H [facility] EMILY Pickard      benztropine  1 mg Intramuscular BID PRN Tere Harach, DO      benztropine  1 mg Oral BID PRN Tere Harach, DO      cyanocobalamin  1,000 mcg Oral Daily EMILY Pickard      hydrOXYzine HCL  50 mg Oral Q6H PRN Max 4/day Tereai Rodriguezach, DO      Or    diphenhydrAMINE  50 mg Intramuscular Q6H PRN Tere Harach, DO      ergocalciferol  50,000 Units Oral Weekly EMILY Pickard      folic acid  1 mg Oral Daily Yuniel Duran MD      hydrOXYzine HCL  100 mg Oral Q6H PRN Max 4/day Tere Harach, DO      Or    LORazepam  2 mg Intramuscular Q6H PRN Tere Harach, DO      hydrOXYzine HCL  25 mg Oral Q6H PRN Max 4/day Tere Harach, DO      melatonin  3 mg Oral HS Tere Harach, DO      multivitamin-minerals  1 tablet Oral Daily Yuniel Duran MD      naltrexone  50 mg Oral Daily Yuniel Duran MD      nicotine polacrilex  4 mg Oral Q2H PRN Keri Delcid MD      OLANZapine  5 mg Oral Q4H PRN Max 3/day Teerai Rodriguezach, DO      Or     OLANZapine  2.5 mg Intramuscular Q4H PRN Max 3/day Tere Harach, DO      OLANZapine  5 mg Oral Q3H PRN Max 3/day Tere Harach, DO      Or    OLANZapine  5 mg Intramuscular Q3H PRN Max 3/day Tere Harach, DO      OLANZapine  2.5 mg Oral Q4H PRN Max 6/day Tere Harach, DO      ondansetron  4 mg Oral Q6H PRN Yuniel Duran MD      polyethylene glycol  17 g Oral Daily PRN Tere Harach, DO      polyethylene glycol  17 g Oral Daily EMILY Salazar      propranolol  10 mg Oral Q8H PRN Tere Harach, DO      senna  1 tablet Oral HS EMILY Salazar      senna-docusate sodium  1 tablet Oral Daily PRN Tereai Rodriguezach, DO      sertraline  100 mg Oral Daily Yuniel Duran MD      Thiamine Mononitrate  100 mg Oral Daily Yuniel Duran MD        PRN:    acetaminophen    acetaminophen    acetaminophen    aluminum-magnesium hydroxide-simethicone    benztropine    benztropine    hydrOXYzine HCL **OR** diphenhydrAMINE    hydrOXYzine HCL **OR** LORazepam    hydrOXYzine HCL    nicotine polacrilex    OLANZapine **OR** OLANZapine    OLANZapine **OR** OLANZapine    OLANZapine    ondansetron    polyethylene glycol    propranolol    senna-docusate sodium       Subjective     Patient was visited on unit for continuing care; chart reviewed and discussed with multidisciplinary treatment team.  On approach, the patient was calm and cooperative. Denied any changes in mood, appetite, and energy level.  Notes that she was bothered yesterday by one of the peers on the unit.  Discussed with patient going to nursing when this sort of incident happens and  herself from the situation and utilizing coping skills.  Continues to endorse mood improvement overall with Zoloft. Today is second day of Zoloft 100 mg. No problem initiating and maintaining sleep.  Denied A/VH currently.  Denied SI/HI, intent or plan upon direct inquiry at this time.    Patient continues to be visible in the milieu and interacts with staff and  "peers. No reports of aggression or self-injurious behavior on unit. No PRN medications used in the past 24 hours.    Patient accepted all offered medications and no adverse effects of medications noted or reported.    Objective    Current Mental Status Evaluation:  Appearance: casually dressed, consistent with stated age  Motor: no psychomotor retardation, no gait abnormalities  Behavior: cooperative, answers questions appropriately  Speech: regular rate/prosody, normal rhythm  Mood: \"better\"  Affect: full range, less depressed-appearing  Thought Process: linear and goal-oriented  Thought Content: denies delusions and paranoia  Risk Potential: denies suicidal ideation, plan, or intent. Denies homicidal ideation  Perceptions: denies auditory hallucinations, denies visual hallucinations,   Sensorium: Oriented to person, place, time, and situation  Cognition: cognitive ability appears intact but was not quantitatively tested  Consciousness: alert and awake  Attention: intact, able to focus without difficulty  Insight: improving  Judgement: improving            Vital signs in last 24 hours:    Temp:  [97.3 °F (36.3 °C)-97.8 °F (36.6 °C)] 97.8 °F (36.6 °C)  HR:  [81-95] 95  BP: (120-153)/(74-77) 153/74  Resp:  [16-17] 16  SpO2:  [97 %-99 %] 99 %  O2 Device: None (Room air)    Psychiatric Review of Systems:  Medication adverse effects: none  Sleep: unchanged  Appetite: unchanged  Behaviors over the past 24 hours: unchanged    Laboratory results:    I have personally reviewed all pertinent laboratory/tests results  No results found for this or any previous visit (from the past 48 hours).       Progress Toward Goals & Illness Status:   improving, attends groups, participates in milieu therapy    Patient is not at goal. They are not yet ready for discharge. The patient's condition currently requires active psychopharmacological medication management, interdisciplinary coordination with case management, and the utilization of " adjunctive milieu and group therapy to augment psychopharmacological efficacy. The patient's risk of morbidity, and progression or decompensation of psychiatric disease, is higher without this current treatment.     Next of Kin  Extended Emergency Contact Information  Primary Emergency Contact: Pina Isaac   [place]  Mobile Phone: [phone]  Relation: Sister  Secondary Emergency Contact: Sara Ken  Mobile Phone: [phone]  Relation: Mother  Preferred language: Czech   needed? Yes    Counseling / Coordination of Care  Patient's progress discussed with staff in treatment team meeting.  Medications, treatment progress and treatment plan reviewed with patient.  Medication education provided to patient.  Educated on importance of medication and treatment compliance.  Supportive therapy provided to patient.  Cognitive techniques utilized during the session.  Reassurance and supportive therapy provided.  Reoriented to reality and reassured.  Encouraged participation in milieu and group therapy on the unit.  Crisis/safety plan discussed with patient.       Yuniel Duran MD  Attending Psychiatrist   [facility]      This note has been constructed using a voice recognition system. There may be translation, syntax, or grammatical errors. If you have any questions, please contact the dictating provider.

## 2024-11-29 NOTE — NURSING NOTE
Pt is calm and cooperative. Visible in the milieu using the phone, social with peers. Denies SI, HI, AH, VH, and pain. Compliant with medications and snack. Denies unmet needs at this time.

## 2024-11-29 NOTE — PROGRESS NOTES
11/29/24 0841   Team Meeting   Meeting Type Daily Rounds   Team Members Present   Team Members Present Physician;Nurse;   Physician Team Member Renee   [facility] August   Patient/Family Present   Patient Present No   Patient's Family Present No     Pt med/meal compliant. Visible on the unit. Focused on discharge. Discharge Monday.

## 2024-11-29 NOTE — ASSESSMENT & PLAN NOTE
Patient is a 29-year-old female with past medical history significant for gastric sleeve and past psychiatric history significant for anxiety and depression as well as AUD, stimulant use disorder (cocaine), and THC use.  Patient presented to the ED secondary to concerns for possible detox as well as unstable mood.  In the ED HOST was contacted and they recommended that patient seek inpatient psych treatment prior to substance use treatment.  Patient is now admitted to  on a 201 secondary to anxiety and depression.    Patient seen this morning in her room.  She endorses that mood is improving with Zoloft.  Thus far no issues with tolerating higher dose of Zoloft at 100 mg.  Will continue observe response over the weekend but likely this will be stabilizing dose and discharge will be on Monday.    -Continue Zoloft  100 mg QD   -Substance use treatment referral on d/c, [facility]?  -Encourage group attendance and utilization of coping skills

## 2024-11-29 NOTE — PROGRESS NOTES
11/29/24 1000   Activity/Group Checklist   Group Admission/Discharge   Attendance Attended   Attendance Duration (min) 0-15   Interactions Interacted appropriately   Affect/Mood Appropriate   Goals Achieved Identified feelings;Identified triggers;Identified relapse prevention strategies;Identified medication adherence strategies;Discussed safety plan;Discussed coping strategies;Discussed discharge plans;Identified resources and support systems;Able to listen to others;Able to reflect/comment on own behavior;Able to self-disclose;Able to recieve feedback     Relapse prevention plan completed with pt. Pt pleasant and cooperative. Pt completed plan appropriately with minimal prompting. Pt looking forward to discharge.

## 2024-11-29 NOTE — ASSESSMENT & PLAN NOTE
-ZAKI d/c'd today given no clinical indication  -thiamine and folate vitamin supplementation  -VS stable  -cont naltrexone 50

## 2024-11-29 NOTE — PLAN OF CARE
Problem: DEPRESSION  Goal: Will be euthymic at discharge  Description: INTERVENTIONS:  - Administer medication as ordered  - Provide emotional support via 1:1 interaction with staff  - Encourage involvement in milieu/groups/activities  - Monitor for social isolation  Outcome: Progressing     Problem: ANXIETY  Goal: Will report anxiety at manageable levels  Description: INTERVENTIONS:  - Administer medication as ordered  - Teach and encourage coping skills  - Provide emotional support  - Assess patient/family for anxiety and ability to cope  Outcome: Progressing  Goal: By discharge: Patient will verbalize 2 strategies to deal with anxiety  Description: Interventions:  - Identify any obvious source/trigger to anxiety  - Staff will assist patient in applying identified coping technique/skills  - Encourage attendance of scheduled groups and activities  Outcome: Progressing     Problem: Ineffective Coping  Goal: Participates in unit activities  Description: Interventions:  - Provide therapeutic environment   - Provide required programming   - Redirect inappropriate behaviors   Outcome: Progressing     Problem: DISCHARGE PLANNING - CARE MANAGEMENT  Goal: Discharge to post-acute care or home with appropriate resources  Description: INTERVENTIONS:  - Conduct assessment to determine patient/family and health care team treatment goals, and need for post-acute services based on payer coverage, community resources, and patient preferences, and barriers to discharge  - Address psychosocial, clinical, and financial barriers to discharge as identified in assessment in conjunction with the patient/family and health care team  - Arrange appropriate level of post-acute services according to patient’s   needs and preference and payer coverage in collaboration with the physician and health care team  - Communicate with and update the patient/family, physician, and health care team regarding progress on the discharge plan  - Arrange  appropriate transportation to post-acute venues  Outcome: Progressing

## 2024-11-30 PROCEDURE — 99232 SBSQ HOSP IP/OBS MODERATE 35: CPT | Performed by: PSYCHIATRY & NEUROLOGY

## 2024-11-30 RX ORDER — ECHINACEA PURPUREA EXTRACT 125 MG
1 TABLET ORAL
Status: DISCONTINUED | OUTPATIENT
Start: 2024-11-30 | End: 2024-12-02 | Stop reason: HOSPADM

## 2024-11-30 RX ADMIN — AMOXICILLIN AND CLAVULANATE POTASSIUM 1 TABLET: 875; 125 TABLET, FILM COATED ORAL at 21:02

## 2024-11-30 RX ADMIN — POLYETHYLENE GLYCOL 3350 17 G: 17 POWDER, FOR SOLUTION ORAL at 08:37

## 2024-11-30 RX ADMIN — SALINE NASAL SPRAY 1 SPRAY: 1.5 SOLUTION NASAL at 21:02

## 2024-11-30 RX ADMIN — SALINE NASAL SPRAY 1 SPRAY: 1.5 SOLUTION NASAL at 12:31

## 2024-11-30 RX ADMIN — AMOXICILLIN AND CLAVULANATE POTASSIUM 1 TABLET: 875; 125 TABLET, FILM COATED ORAL at 08:37

## 2024-11-30 RX ADMIN — THIAMINE HCL TAB 100 MG 100 MG: 100 TAB at 08:37

## 2024-11-30 RX ADMIN — CYANOCOBALAMIN TAB 1000 MCG 1000 MCG: 1000 TAB at 08:37

## 2024-11-30 RX ADMIN — FOLIC ACID 1 MG: 1 TABLET ORAL at 08:37

## 2024-11-30 RX ADMIN — MULTIPLE VITAMINS W/ MINERALS TAB 1 TABLET: TAB ORAL at 08:37

## 2024-11-30 RX ADMIN — NICOTINE POLACRILEX 4 MG: 4 GUM, CHEWING BUCCAL at 21:02

## 2024-11-30 RX ADMIN — SENNOSIDES 8.6 MG: 8.6 TABLET, FILM COATED ORAL at 21:02

## 2024-11-30 RX ADMIN — MELATONIN TAB 3 MG 3 MG: 3 TAB at 21:02

## 2024-11-30 RX ADMIN — SERTRALINE HYDROCHLORIDE 100 MG: 100 TABLET ORAL at 08:37

## 2024-11-30 RX ADMIN — NICOTINE POLACRILEX 4 MG: 4 GUM, CHEWING BUCCAL at 15:04

## 2024-11-30 RX ADMIN — NICOTINE POLACRILEX 4 MG: 4 GUM, CHEWING BUCCAL at 12:31

## 2024-11-30 RX ADMIN — NALTREXONE HYDROCHLORIDE 50 MG: 50 TABLET, FILM COATED ORAL at 08:37

## 2024-11-30 NOTE — ASSESSMENT & PLAN NOTE
-sub use treatment referral on d/c  -reports dry nostrils and congestion secondary to chronic cocaine use, added saline nasal spray PRN

## 2024-11-30 NOTE — PROGRESS NOTES
Progress Note - Behavioral Health   Name: Lena Ken 29 y.o. female I MRN: [id number]  Unit/Bed#: -02 I Date of Admission: 11/24/2024   Date of Service: 11/30/2024 I Hospital Day: 6     Assessment & Plan  Major depression, recurrent (HCC)  Patient is a 29-year-old female with past medical history significant for gastric sleeve and past psychiatric history significant for anxiety and depression as well as AUD, stimulant use disorder (cocaine), and THC use.  Patient presented to the ED secondary to concerns for possible detox as well as unstable mood.  In the ED HOST was contacted and they recommended that patient seek inpatient psych treatment prior to substance use treatment.  Patient is now admitted to  on a 201 secondary to anxiety and depression.    Patient seen this morning in her room.  She endorses that mood is improving with Zoloft.  Thus far no issues with tolerating higher dose of Zoloft at 100 mg.  Will continue observe response over the weekend but likely this will be stabilizing dose and discharge will be on Monday.    -Continue Zoloft  100 mg QD   -Substance use treatment referral on d/c, [facility]?  -Encourage group attendance and utilization of coping skills    Alcohol abuse  -CIWA d/c'd today given no clinical indication  -thiamine and folate vitamin supplementation  -VS stable  -cont naltrexone 50  Cocaine abuse (HCC)  -sub use treatment referral on d/c  -reports dry nostrils and congestion secondary to chronic cocaine use, added saline nasal spray PRN  Mild tetrahydrocannabinol (THC) abuse  -sub use treatment referral on d/c  Medical clearance for psychiatric admission    H/O gastric sleeve  -B12 and vitamin D supplementation  Noncompliance    UTI (urinary tract infection)  -SLIM aware  -started Augmentin 875 mg Q12  for 5 days, course will be completed tomorrow 11/30        ------------------------------------------------------------    Subjective: Patient's chart was  "reviewed, and patient's progress and plan was discussed with treatment team. Per nursing report, Lena has been compliant with medications and meals.    Lena was evaluated this morning for continuity of care. On examination, Lena is calm, cooperative, and pleasant.. She states her mood is \"good\" and states that she feels \"like the medications are really working\".  She denies any issues with sleep, appetite, or energy.  She denies adverse effects from medications.  She does endorse some congestion, and dried nostril and was amenable to using saline nasal spray. She denies suicidal ideation, homicidal ideation, auditory hallucinations, and visual hallucination.     VS: Reviewed, within normal limits    Progress Toward Goals: slow improvement    Psychiatric Review of Systems:  Behavior over the last 24 hours: improved  Sleep: normal  Appetite: adequate  Medication side effects: none verbalized  Medical ROS:  congestion, dry nose    Vital signs in last 24 hours:  Temp:  [97.4 °F (36.3 °C)] 97.4 °F (36.3 °C)  HR:  [85-88] 88  BP: (137-148)/(73-85) 137/73  Resp:  [16-17] 16  SpO2:  [98 %] 98 %  O2 Device: None (Room air)    Mental Status Exam:    Appearance:  appears stated age, casually dressed, and appropriate grooming and hygiene   Behavior:  calm, cooperative, and pleasant   Speech:  spontaneous, normal rate, normal volume, and coherent   Mood:  \"Good\"   Affect:  Constricted, but bright at times, mood congruent   Thought Process:  Organized, logical, goal-directed   Associations: intact associations   Thought Content:  no verbalized delusions or overt paranoia   Perceptual Disturbances: no reported hallucinations and does not appear to be responding to internal stimuli at this time   Risk Potential: Suicidal ideation - None at present  Homicidal ideation - None at present  Potential for aggression - No   Sensorium:  oriented to person, place, and time/date   Memory:  recent and remote memory grossly intact "   Consciousness:  alert and awake   Attention/Concentration: attention span and concentration are age appropriate   Insight:  improving   Judgment: improving   Gait/Station: normal gait/station   Motor Activity: no abnormal movements     Current Medications:  Current Facility-Administered Medications   Medication Dose Route Frequency Provider Last Rate    acetaminophen  650 mg Oral Q6H PRN Tere Harach, DO      acetaminophen  650 mg Oral Q4H PRN Tere Harach, DO      acetaminophen  975 mg Oral Q6H PRN Tere Harach, DO      aluminum-magnesium hydroxide-simethicone  30 mL Oral Q4H PRN Tere Harach, DO      amoxicillin-clavulanate  1 tablet Oral Q12H [facility] EMILY Pickard      benztropine  1 mg Intramuscular BID PRN Tere Harach, DO      benztropine  1 mg Oral BID PRN Tere Rodriguezach, DO      cyanocobalamin  1,000 mcg Oral Daily EMILY Pickard      hydrOXYzine HCL  50 mg Oral Q6H PRN Max 4/day Tere Linton, DO      Or    diphenhydrAMINE  50 mg Intramuscular Q6H PRN Tere Linton, DO      ergocalciferol  50,000 Units Oral Weekly EMILY Pickard      folic acid  1 mg Oral Daily Yuniel Duran MD      hydrOXYzine HCL  100 mg Oral Q6H PRN Max 4/day Tere Linton, DO      Or    LORazepam  2 mg Intramuscular Q6H PRN Tere Rodriguezach, DO      hydrOXYzine HCL  25 mg Oral Q6H PRN Max 4/day Tere Linton, DO      melatonin  3 mg Oral HS Tere Rodriguezach, DO      multivitamin-minerals  1 tablet Oral Daily Yuniel Duran MD      naltrexone  50 mg Oral Daily Yuniel Duran MD      nicotine polacrilex  4 mg Oral Q2H PRN Keri Delcid MD      OLANZapine  5 mg Oral Q4H PRN Max 3/day Tere Harach, DO      Or    OLANZapine  2.5 mg Intramuscular Q4H PRN Max 3/day Tere Harach, DO      OLANZapine  5 mg Oral Q3H PRN Max 3/day Tere Harach, DO      Or    OLANZapine  5 mg Intramuscular Q3H PRN Max 3/day Tere Harach, DO      OLANZapine  2.5 mg Oral Q4H PRN Max 6/day Tere Harach, DO      ondansetron  4 mg Oral Q6H PRN  Yuniel Duran MD      polyethylene glycol  17 g Oral Daily PRN Tere Linton, DO      polyethylene glycol  17 g Oral Daily EMILY Salazar      propranolol  10 mg Oral Q8H PRN Tere Linton, DO      senna  1 tablet Oral HS EMILY Salazar      senna-docusate sodium  1 tablet Oral Daily PRN Tere Linton, DO      sertraline  100 mg Oral Daily Yuniel Duran MD      sodium chloride  1 spray Each Nare Q1H PRN Roger Estrada MD      Thiamine Mononitrate  100 mg Oral Daily Yuniel Duran MD         Behavioral Health Medications: all current active meds have been reviewed. Changes as in plan section above.    Laboratory results:  I have personally reviewed all pertinent laboratory/tests results.   No results found for this or any previous visit (from the past 48 hours).     Counseling / Coordination of Care:          Roger Estrada MD 11/30/24  Psychiatry Residency, PGY-II  This note has been constructed using a voice recognition system. There may be translation, syntax, or grammatical errors. If you have any questions, please contact the dictating author.

## 2024-11-30 NOTE — NURSING NOTE
Pt verbally denies SI, HI and A/V hallucinations. Visible in patient lounge, eating breakfast and effectively socializing with peers. Compliant with meds. Mood is unremarkable. Behaviorally stable. Perceptions are reality based. Content implies mild constipation with focus on miralax that was provided. Otherwise unremarkable. Attire is personal attire by choice. Q15 min rounds by RN.

## 2024-11-30 NOTE — NURSING NOTE
Patient noted out of room , socializes with certain peers and enjoys talking on the phone. Patient Compliant with medication and routine vitals. Denies SI/HI/AH/VH at this time.

## 2024-11-30 NOTE — ASSESSMENT & PLAN NOTE
Patient is a 29-year-old female with past medical history significant for gastric sleeve and past psychiatric history significant for anxiety and depression as well as AUD, stimulant use disorder (cocaine), and THC use.  Patient presented to the ED secondary to concerns for possible detox as well as unstable mood.  In the ED HOST was contacted and they recommended that patient seek inpatient psych treatment prior to substance use treatment.  Patient is now admitted to  on a 201 secondary to anxiety and depression.    Patient seen this morning in her room.  She endorses that mood is improving with Zoloft.  Thus far no issues with tolerating higher dose of Zoloft at 100 mg.  Will continue observe response over the weekend but likely this will be stabilizing dose and discharge will be on Monday.    -Continue Zoloft  100 mg QD   -Substance use treatment referral on d/c, [facility]?  -Encourage group attendance and utilization of coping skills

## 2024-12-01 PROCEDURE — 99232 SBSQ HOSP IP/OBS MODERATE 35: CPT | Performed by: PSYCHIATRY & NEUROLOGY

## 2024-12-01 RX ADMIN — SENNOSIDES 8.6 MG: 8.6 TABLET, FILM COATED ORAL at 21:07

## 2024-12-01 RX ADMIN — THIAMINE HCL TAB 100 MG 100 MG: 100 TAB at 08:27

## 2024-12-01 RX ADMIN — SERTRALINE HYDROCHLORIDE 100 MG: 100 TABLET ORAL at 08:27

## 2024-12-01 RX ADMIN — NICOTINE POLACRILEX 4 MG: 4 GUM, CHEWING BUCCAL at 19:58

## 2024-12-01 RX ADMIN — NICOTINE POLACRILEX 4 MG: 4 GUM, CHEWING BUCCAL at 17:07

## 2024-12-01 RX ADMIN — FOLIC ACID 1 MG: 1 TABLET ORAL at 08:27

## 2024-12-01 RX ADMIN — ACETAMINOPHEN 975 MG: 325 TABLET, FILM COATED ORAL at 07:52

## 2024-12-01 RX ADMIN — SALINE NASAL SPRAY 1 SPRAY: 1.5 SOLUTION NASAL at 12:39

## 2024-12-01 RX ADMIN — POLYETHYLENE GLYCOL 3350 17 G: 17 POWDER, FOR SOLUTION ORAL at 08:27

## 2024-12-01 RX ADMIN — MELATONIN TAB 3 MG 6 MG: 3 TAB at 21:07

## 2024-12-01 RX ADMIN — MULTIPLE VITAMINS W/ MINERALS TAB 1 TABLET: TAB ORAL at 08:27

## 2024-12-01 RX ADMIN — NICOTINE POLACRILEX 4 MG: 4 GUM, CHEWING BUCCAL at 12:38

## 2024-12-01 RX ADMIN — NALTREXONE HYDROCHLORIDE 50 MG: 50 TABLET, FILM COATED ORAL at 08:27

## 2024-12-01 RX ADMIN — CYANOCOBALAMIN TAB 1000 MCG 1000 MCG: 1000 TAB at 08:27

## 2024-12-01 NOTE — NURSING NOTE
Pt verbally denies SI, HI and A/V hallucinations. Visible OOB, social with peers, interactive in milieu. Compliant with meds and staff direction. Currently sitting in dayroom watching TV. Mood is pleasant. Stable behaviors. Reality based perception and content. In hospital attire, but requested donations. Pt informed RN is unable to reach donation department till Monday. Q15min rounds by RN.

## 2024-12-01 NOTE — NURSING NOTE
Pt visible on unit and social with peers. Pleasant on approach and denies SI/HI/AVH. No paranoia noted on interaction. Pt requested and given nicotine gum and nasal spray with HS meds. Pt is requesting increase in melatonin dose as she is having night time awakening with difficulty falling back to sleep; e-message left for MD. Compliant with unit routines and care. Safety checks ongoing.

## 2024-12-01 NOTE — PROGRESS NOTES
Progress Note - Behavioral Health   Name: Lena Ken 29 y.o. female I MRN: [id number]  Unit/Bed#: -02 I Date of Admission: 11/24/2024   Date of Service: 12/1/2024 I Hospital Day: 7     Assessment & Plan  Major depression, recurrent (HCC)  Patient is a 29-year-old female with past medical history significant for gastric sleeve and past psychiatric history significant for anxiety and depression as well as AUD, stimulant use disorder (cocaine), and THC use.  Patient presented to the ED secondary to concerns for possible detox as well as unstable mood.  In the ED HOST was contacted and they recommended that patient seek inpatient psych treatment prior to substance use treatment.  Patient is now admitted to  on a 201 secondary to anxiety and depression.    Patient seen this morning in her room.  She endorses that mood is improving with Zoloft.  Thus far no issues with tolerating higher dose of Zoloft at 100 mg.  Will continue observe response over the weekend but likely this will be stabilizing dose and discharge will be on Monday.    -Continue Zoloft  100 mg QD   -Substance use treatment referral on d/c, [facility]?  -Encourage group attendance and utilization of coping skills    Alcohol abuse  -CIWA d/c'd today given no clinical indication  -thiamine and folate vitamin supplementation  -VS stable  -cont naltrexone 50  Cocaine abuse (HCC)  -sub use treatment referral on d/c  -reports dry nostrils and congestion secondary to chronic cocaine use, added saline nasal spray PRN  Mild tetrahydrocannabinol (THC) abuse  -sub use treatment referral on d/c  Medical clearance for psychiatric admission    H/O gastric sleeve  -B12 and vitamin D supplementation  Noncompliance    UTI (urinary tract infection)  -SLIM aware  -5 day course of augmentin completed 11/30        ------------------------------------------------------------    Subjective: Patient's chart was reviewed, and patient's progress and plan was  "discussed with treatment team. Per nursing report, Lena has been cooperative on the unit and compliant with medications. Yesterday, patient required Miralax PRN for mild constipation which was effective and saline nasal spray PRN for congestion which was effective.    Lena was evaluated this morning for continuity of care. On examination, Lena is calm, cooperative, pleasant, and bright on approach. She states her mood is \"Good\". She reiterates that she thinks the medications are working to control her mood, and states that she will continue them upon discharge. Patient reports that she is excited for discharge, which is tentatively scheduled for tomorrow, and feels as though she is ready. She denies any issues with sleep, appetite, or energy. She denies adverse effects from medications. She denies suicidal ideation, homicidal ideation, auditory hallucinations, and visual hallucinations.     VS: Reviewed, within normal limits    Progress Toward Goals: slow improvement    Psychiatric Review of Systems:  Behavior over the last 24 hours: improved  Sleep: normal  Appetite: adequate  Medication side effects: none verbalized  Medical ROS: Complete review of systems is negative except as noted above.    Vital signs in last 24 hours:  Temp:  [97.2 °F (36.2 °C)-97.9 °F (36.6 °C)] 97.2 °F (36.2 °C)  HR:  [88-90] 88  BP: (144)/(62-71) 144/62  Resp:  [16] 16  SpO2:  [95 %-96 %] 95 %  O2 Device: None (Room air)    Mental Status Exam:    Appearance:  appears stated age, casually dressed, and appropriate grooming and hygiene   Behavior:  calm, cooperative, and pleasant   Speech:  spontaneous, normal rate, normal volume, and coherent   Mood:  \"Good\"   Affect:  Euthymic   Thought Process:  Organized, logical, goal-directed   Associations: intact associations   Thought Content:  no verbalized delusions or overt paranoia   Perceptual Disturbances: no reported hallucinations and does not appear to be responding to internal stimuli " at this time   Risk Potential: Suicidal ideation - None at present  Homicidal ideation - None at present  Potential for aggression - No   Sensorium:  oriented to person, place, and time/date   Memory:  recent and remote memory grossly intact   Consciousness:  alert and awake   Attention/Concentration: attention span and concentration are age appropriate   Insight:  improving   Judgment: improving   Gait/Station: normal gait/station   Motor Activity: no abnormal movements     Current Medications:  Current Facility-Administered Medications   Medication Dose Route Frequency Provider Last Rate    acetaminophen  650 mg Oral Q6H PRN Tere Harach, DO      acetaminophen  650 mg Oral Q4H PRN Tere Harach, DO      acetaminophen  975 mg Oral Q6H PRN Tere Harach, DO      aluminum-magnesium hydroxide-simethicone  30 mL Oral Q4H PRN Tere Harach, DO      benztropine  1 mg Intramuscular BID PRN Tere Harach, DO      benztropine  1 mg Oral BID PRN Tere Harach, DO      cyanocobalamin  1,000 mcg Oral Daily EMILY Pickard      hydrOXYzine HCL  50 mg Oral Q6H PRN Max 4/day Tere Harach, DO      Or    diphenhydrAMINE  50 mg Intramuscular Q6H PRN Tere Harach, DO      ergocalciferol  50,000 Units Oral Weekly EMILY Pickard      folic acid  1 mg Oral Daily Yuniel Duran MD      hydrOXYzine HCL  100 mg Oral Q6H PRN Max 4/day Tere Harach, DO      Or    LORazepam  2 mg Intramuscular Q6H PRN Tere Harach, DO      hydrOXYzine HCL  25 mg Oral Q6H PRN Max 4/day Tere Harach, DO      melatonin  3 mg Oral HS Tere Harach, DO      multivitamin-minerals  1 tablet Oral Daily Yuniel Duran MD      naltrexone  50 mg Oral Daily Yuniel Duran MD      nicotine polacrilex  4 mg Oral Q2H PRN Keri Delcid MD      OLANZapine  5 mg Oral Q4H PRN Max 3/day Tere Harach, DO      Or    OLANZapine  2.5 mg Intramuscular Q4H PRN Max 3/day Tere Harach, DO      OLANZapine  5 mg Oral Q3H PRN Max 3/day Tere Harach, DO      Or     OLANZapine  5 mg Intramuscular Q3H PRN Max 3/day Tere Linton, DO      OLANZapine  2.5 mg Oral Q4H PRN Max 6/day Tere Linton, DO      ondansetron  4 mg Oral Q6H PRN Yuniel Duran MD      polyethylene glycol  17 g Oral Daily PRN Tere Rodriguezach, DO      polyethylene glycol  17 g Oral Daily EMILY Salazar      propranolol  10 mg Oral Q8H PRN Tere Linton, DO      senna  1 tablet Oral HS EMILY Salazar      senna-docusate sodium  1 tablet Oral Daily PRN Tere Linton, DO      sertraline  100 mg Oral Daily Yuniel Duran MD      sodium chloride  1 spray Each Nare Q1H PRN Roger Estrada MD      Thiamine Mononitrate  100 mg Oral Daily Yuniel Duran MD         Behavioral Health Medications: all current active meds have been reviewed. Changes as in plan section above.    Laboratory results:  I have personally reviewed all pertinent laboratory/tests results.   No results found for this or any previous visit (from the past 48 hours).     Counseling / Coordination of Care:  Patient's progress discussed with staff in treatment team meeting.  Patient's progress reviewed with case management staff.  Patient's diagnosis and treatment indicated reviewed with patient.        Roger Estrada MD 12/01/24  Psychiatry Residency, PGY-II  This note has been constructed using a voice recognition system. There may be translation, syntax, or grammatical errors. If you have any questions, please contact the dictating author.

## 2024-12-01 NOTE — ASSESSMENT & PLAN NOTE
Patient is a 29-year-old female with past medical history significant for gastric sleeve and past psychiatric history significant for anxiety and depression as well as AUD, stimulant use disorder (cocaine), and THC use.  Patient presented to the ED secondary to concerns for possible detox as well as unstable mood.  In the ED HOST was contacted and they recommended that patient seek inpatient psych treatment prior to substance use treatment.  Patient is now admitted to  on a 201 secondary to anxiety and depression.    Patient seen this morning in her room.  She endorses that mood is improving with Zoloft.  Thus far no issues with tolerating higher dose of Zoloft at 100 mg.  Will continue observe response over the weekend but likely this will be stabilizing dose and discharge will be on Monday.    -Continue Zoloft  100 mg QD   -Substance use treatment referral on d/c, [facility]?  -Encourage group attendance and utilization of coping skills

## 2024-12-02 VITALS
RESPIRATION RATE: 16 BRPM | WEIGHT: 156.2 LBS | HEART RATE: 77 BPM | SYSTOLIC BLOOD PRESSURE: 143 MMHG | BODY MASS INDEX: 23.13 KG/M2 | DIASTOLIC BLOOD PRESSURE: 75 MMHG | TEMPERATURE: 98.2 F | OXYGEN SATURATION: 100 % | HEIGHT: 69 IN

## 2024-12-02 PROCEDURE — 99238 HOSP IP/OBS DSCHRG MGMT 30/<: CPT | Performed by: PSYCHIATRY & NEUROLOGY

## 2024-12-02 RX ORDER — THIAMINE MONONITRATE (VIT B1) 100 MG
100 TABLET ORAL DAILY
Qty: 30 TABLET | Refills: 0 | Status: SHIPPED | OUTPATIENT
Start: 2024-12-02

## 2024-12-02 RX ORDER — SERTRALINE HYDROCHLORIDE 100 MG/1
100 TABLET, FILM COATED ORAL DAILY
Qty: 30 TABLET | Refills: 0 | Status: SHIPPED | OUTPATIENT
Start: 2024-12-02

## 2024-12-02 RX ORDER — NALTREXONE HYDROCHLORIDE 50 MG/1
50 TABLET, FILM COATED ORAL DAILY
Qty: 30 TABLET | Refills: 0 | Status: SHIPPED | OUTPATIENT
Start: 2024-12-02

## 2024-12-02 RX ADMIN — ACETAMINOPHEN 975 MG: 325 TABLET, FILM COATED ORAL at 09:39

## 2024-12-02 RX ADMIN — SERTRALINE HYDROCHLORIDE 100 MG: 100 TABLET ORAL at 08:08

## 2024-12-02 RX ADMIN — NICOTINE POLACRILEX 4 MG: 4 GUM, CHEWING BUCCAL at 09:42

## 2024-12-02 RX ADMIN — FOLIC ACID 1 MG: 1 TABLET ORAL at 08:08

## 2024-12-02 RX ADMIN — THIAMINE HCL TAB 100 MG 100 MG: 100 TAB at 08:08

## 2024-12-02 RX ADMIN — POLYETHYLENE GLYCOL 3350 17 G: 17 POWDER, FOR SOLUTION ORAL at 09:40

## 2024-12-02 RX ADMIN — NALTREXONE HYDROCHLORIDE 50 MG: 50 TABLET, FILM COATED ORAL at 08:08

## 2024-12-02 RX ADMIN — MULTIPLE VITAMINS W/ MINERALS TAB 1 TABLET: TAB ORAL at 08:08

## 2024-12-02 RX ADMIN — ERGOCALCIFEROL 50000 UNITS: 1.25 CAPSULE ORAL at 08:08

## 2024-12-02 RX ADMIN — CYANOCOBALAMIN TAB 1000 MCG 1000 MCG: 1000 TAB at 08:08

## 2024-12-02 NOTE — NURSING NOTE
Pt pleasant, bright and social on unit. Watching tv and attending group. Denies SI/HI/AVH. Denies unmet needs or concerns. Compliant with unit routines and care. Safety checks ongoing.

## 2024-12-02 NOTE — PLAN OF CARE
Problem: DEPRESSION  Goal: Will be euthymic at discharge  Description: INTERVENTIONS:  - Administer medication as ordered  - Provide emotional support via 1:1 interaction with staff  - Encourage involvement in milieu/groups/activities  - Monitor for social isolation  Outcome: Completed     Problem: ANXIETY  Goal: Will report anxiety at manageable levels  Description: INTERVENTIONS:  - Administer medication as ordered  - Teach and encourage coping skills  - Provide emotional support  - Assess patient/family for anxiety and ability to cope  Outcome: Completed  Goal: By discharge: Patient will verbalize 2 strategies to deal with anxiety  Description: Interventions:  - Identify any obvious source/trigger to anxiety  - Staff will assist patient in applying identified coping technique/skills  - Encourage attendance of scheduled groups and activities  Outcome: Completed     Problem: Ineffective Coping  Goal: Participates in unit activities  Description: Interventions:  - Provide therapeutic environment   - Provide required programming   - Redirect inappropriate behaviors   Outcome: Completed     Problem: DISCHARGE PLANNING - CARE MANAGEMENT  Goal: Discharge to post-acute care or home with appropriate resources  Description: INTERVENTIONS:  - Conduct assessment to determine patient/family and health care team treatment goals, and need for post-acute services based on payer coverage, community resources, and patient preferences, and barriers to discharge  - Address psychosocial, clinical, and financial barriers to discharge as identified in assessment in conjunction with the patient/family and health care team  - Arrange appropriate level of post-acute services according to patient’s   needs and preference and payer coverage in collaboration with the physician and health care team  - Communicate with and update the patient/family, physician, and health care team regarding progress on the discharge plan  - Arrange  appropriate transportation to post-acute venues  Outcome: Completed
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
## 2024-12-02 NOTE — DISCHARGE INSTR - APPOINTMENTS
Behavioral Health Nurse Navigator, Liseth or Natalie will be calling you after your discharge, on the phone number that you provided.  They will be available as an additional support, if needed.   If you wish to speak with Liseth, you may contact her at [phone].

## 2024-12-02 NOTE — BH TRANSITION RECORD
Contact Information: If you have any questions, concerns, pended studies, tests and/or procedures, or emergencies regarding your inpatient behavioral health visit. Please contact [facility] [phone]  and ask to speak to a , nurse or physician. A contact is available 24 hours/ 7 days a week at this number.     Summary of Procedures Performed During your Stay:  Below is a list of major procedures performed during your hospital stay and a summary of results:  - Cardiac Procedures/Studies: EKG 11/25/24 - Normal Sinus Rhythm.    Pending Studies (From admission, onward)      None          Please follow up on the above pending studies with your PCP and/or referring provider.

## 2024-12-02 NOTE — NURSING NOTE
Patient has been visible in the milieu. Interacting with select peers. Pleasant, calm and cooperative. Denies symptoms. Looking forward to discharge today.

## 2024-12-02 NOTE — NURSING NOTE
Outpatient appointments and medications reviewed with patient prior to discharge. Prescriptions delivered to the unit for patient from [facility].

## 2024-12-02 NOTE — DISCHARGE INSTR - LAB
If you smoke, use tobacco or nicotine, and/or are exposed to second hand smoke, you are encouraged to stop to improve your health.  If you need help quitting, please talk to your health care provider or call:  [facility]ess[facility] ([phone])  [place] [employer] ([phone])   [place] [employer] ([phone])

## 2024-12-02 NOTE — PROGRESS NOTES
12/02/24 0837   Team Meeting   Meeting Type Daily Rounds   Team Members Present   Team Members Present Physician;Nurse;   Physician Team Member Renee   [facility] August   Patient/Family Present   Patient Present No   Patient's Family Present No     Discharge today.

## 2024-12-02 NOTE — SOCIAL WORK
Pt to D/C today. Pt denies SI/HI/AVH. Pt oriented x3. Pt to d/c to her home via lyft. Pt to follow up with [facility] on 12/4. Pt discharged with medication in hand.     Discharge Address: [address] [place] [address], [facility], [place] [address]   Phone: [phone]

## 2024-12-27 PROBLEM — N39.0 UTI (URINARY TRACT INFECTION): Status: RESOLVED | Noted: 2024-11-27 | Resolved: 2024-12-27

## 2025-01-05 ENCOUNTER — HOSPITAL ENCOUNTER (EMERGENCY)
Facility: HOSPITAL | Age: 30
Discharge: HOME | End: 2025-01-05
Payer: COMMERCIAL

## 2025-01-05 VITALS
RESPIRATION RATE: 16 BRPM | SYSTOLIC BLOOD PRESSURE: 147 MMHG | BODY MASS INDEX: 23.11 KG/M2 | OXYGEN SATURATION: 100 % | WEIGHT: 156.53 LBS | TEMPERATURE: 98.2 F | DIASTOLIC BLOOD PRESSURE: 74 MMHG | HEART RATE: 81 BPM

## 2025-01-05 DIAGNOSIS — Z32.00 ENCOUNTER FOR PREGNANCY TEST: Primary | ICD-10-CM

## 2025-01-05 DIAGNOSIS — Z32.02 NEGATIVE PREGNANCY TEST: ICD-10-CM

## 2025-01-05 LAB
EXT PREGNANCY TEST URINE: NEGATIVE
EXT. CONTROL: NORMAL

## 2025-01-05 PROCEDURE — 99281 EMR DPT VST MAYX REQ PHY/QHP: CPT

## 2025-01-05 PROCEDURE — 81025 URINE PREGNANCY TEST: CPT | Performed by: PHYSICIAN ASSISTANT

## 2025-01-05 PROCEDURE — 99284 EMERGENCY DEPT VISIT MOD MDM: CPT | Performed by: PHYSICIAN ASSISTANT

## 2025-01-05 NOTE — ED PROVIDER NOTES
Time reflects when diagnosis was documented in both MDM as applicable and the Disposition within this note       Time User Action Codes Description Comment    1/5/2025  5:13 PM Linda Pinedo Add [Z32.00] Encounter for pregnancy test     1/5/2025  5:13 PM Linda Pinedo Add [Z32.02] Negative pregnancy test           ED Disposition       ED Disposition   Discharge    Condition   Stable    Date/Time   Sun Jan 5, 2025  5:13 PM    Comment   Lena Ken discharge to home/self care.                   Assessment & Plan       Medical Decision Making  29y.o female presents to the ER for a pregnancy test. Vitals are stable. Patient is in no acute distress. On exam, breathing is non-labored. No tachypnea or accessory muscle use. Lungs are clear. Heart is regular rate and rhythm. Abdomen is not distended. DDX consists of but not limited to: gastroenteritis, pregnancy, well check. Will check pregnancy test.     1714 - Informed patient of negative pregnancy test. Told patient if she is concerned for pregnancy, to take pregnancy precautions and check another home test in 2 weeks.    The management plan was discussed in detail with the patient at bedside and all questions were answered.  Prior to discharge, we provided both verbal and written instructions.  We discussed with the patient the signs and symptoms for which to return to the emergency department.  All questions were answered and patient was comfortable with the plan of care and discharged to home.  Instructed the patient to follow up with the primary care provider and/or specialist provided and their written instructions.  The patient verbalized understanding of our discussion and plan of care, and agrees to return to the Emergency Department for concerns and progression of illness.    At discharge, I instructed the patient to:  -follow up with pcp  -follow up with obgyn  -return to the ER if symptoms worsened or new symptoms arose  Patient agreed to this plan  "and was stable at time of discharge.     Problems Addressed:  Encounter for pregnancy test: acute illness or injury  Negative pregnancy test: acute illness or injury    Amount and/or Complexity of Data Reviewed  Independent Historian:      Details: Patient is historian  Labs: ordered.             Medications - No data to display    ED Risk Strat Scores                          SBIRT 22yo+      Flowsheet Row Most Recent Value   Initial Alcohol Screen: US AUDIT-C     1. How often do you have a drink containing alcohol? 0 Filed at: 01/05/2025 1700   2. How many drinks containing alcohol do you have on a typical day you are drinking?  0 Filed at: 01/05/2025 1700   3b. FEMALE Any Age, or MALE 65+: How often do you have 4 or more drinks on one occassion? 0 Filed at: 01/05/2025 1700   Audit-C Score 0 Filed at: 01/05/2025 1700   RICK: How many times in the past year have you...    Used an illegal drug or used a prescription medication for non-medical reasons? Never Filed at: 01/05/2025 1700                            History of Present Illness       Chief Complaint   Patient presents with    Pregnancy Test     Pt reports worried could be pregnant. States has food aversions. Does have IUD but wants to verify not pregnant with a test       Past Medical History:   Diagnosis Date    Anxiety     Depression     Substance abuse (HCC)       Past Surgical History:   Procedure Laterality Date    BARIATRIC SURGERY        History reviewed. No pertinent family history.   Social History     Tobacco Use    Smoking status: Never    Smokeless tobacco: Current     Types: Chew    Tobacco comments:     Pt states she uses \"Zyn\" nicotine pouches   Vaping Use    Vaping status: Never Used   Substance Use Topics    Alcohol use: Yes     Comment: Per pt, relapsed for one day on 11/22/24 and had \"2 [employer] [place],\" Pt got a DUI in Feb and was drinking daily at that time    Drug use: Yes     Types: Cocaine, Marijuana     Comment: Pt states medical " marijuana      E-Cigarette/Vaping    E-Cigarette Use Never User       E-Cigarette/Vaping Substances    Nicotine No     THC No     CBD No     Flavoring No     Other No     Unknown No       I have reviewed and agree with the history as documented.     29y.o female with PMH fo anxiety, depression and substance abuse presents to the ER for a pregnancy test. Patient states for the last week, she has been experiencing nausea at times. She is wondering if she is pregnant. She has an IUD in place and does not normally get her periods. She has had her IUD for the last 7 years. She plans to go to her OBGYN to have the IUD removed. She denies fever, chills, URI symptoms, chest pain, dyspnea, vomiting, diarrhea, abdominal pain, urinary symptoms, weakness or paresthesias.      History provided by:  Patient   used: No        Review of Systems   Constitutional:  Negative for activity change, appetite change, chills and fever.   HENT:  Negative for congestion, drooling, ear discharge, ear pain, facial swelling, rhinorrhea and sore throat.    Eyes:  Negative for redness.   Respiratory:  Negative for cough and shortness of breath.    Cardiovascular:  Negative for chest pain.   Gastrointestinal:  Positive for nausea. Negative for abdominal pain, diarrhea and vomiting.   Genitourinary:  Negative for dysuria, frequency, hematuria and urgency.   Musculoskeletal:  Negative for neck stiffness.   Skin:  Negative for rash.   Allergic/Immunologic: Negative for food allergies.   Neurological:  Negative for weakness and numbness.           Objective       ED Triage Vitals [01/05/25 1646]   Temperature Pulse Blood Pressure Respirations SpO2 Patient Position - Orthostatic VS   98.2 °F (36.8 °C) 81 147/74 16 100 % --      Temp src Heart Rate Source BP Location FiO2 (%) Pain Score    -- Monitor -- -- --      Vitals      Date and Time Temp Pulse SpO2 Resp BP Pain Score FACES Pain Rating User   01/05/25 1646 98.2 °F (36.8 °C) 81  100 % 16 147/74 -- --             Physical Exam  Vitals and nursing note reviewed.   Constitutional:       General: She is not in acute distress.     Appearance: She is not toxic-appearing.   HENT:      Head: Normocephalic and atraumatic.      Mouth/Throat:      Lips: Pink.      Mouth: Mucous membranes are moist.   Eyes:      Conjunctiva/sclera: Conjunctivae normal.   Neck:      Trachea: No tracheal deviation.   Cardiovascular:      Rate and Rhythm: Normal rate.   Pulmonary:      Effort: Pulmonary effort is normal. No respiratory distress.   Abdominal:      General: There is no distension.   Musculoskeletal:      Cervical back: Normal range of motion and neck supple.   Skin:     General: Skin is warm and dry.      Findings: No rash.   Neurological:      Mental Status: She is alert.      GCS: GCS eye subscore is 4. GCS verbal subscore is 5. GCS motor subscore is 6.   Psychiatric:         Mood and Affect: Mood normal.         Results Reviewed       Procedure Component Value Units Date/Time    POCT pregnancy, urine [[id number]]  (Normal) Collected: 01/05/25 1700    Lab Status: Final result Updated: 01/05/25 1700     EXT Preg Test, Ur Negative     Control Valid            No orders to display       Procedures    ED Medication and Procedure Management   Prior to Admission Medications   Prescriptions Last Dose Informant Patient Reported? Taking?   Thiamine Mononitrate (VITAMIN B1) 100 mg tablet   No No   Sig: Take 1 tablet (100 mg total) by mouth daily   melatonin 3 mg   No No   Sig: Take 2 tablets (6 mg total) by mouth daily at bedtime   naltrexone (REVIA) 50 mg tablet   No No   Sig: Take 1 tablet (50 mg total) by mouth daily   sertraline (ZOLOFT) 100 mg tablet   No No   Sig: Take 1 tablet (100 mg total) by mouth daily      Facility-Administered Medications: None     Discharge Medication List as of 1/5/2025  5:14 PM        CONTINUE these medications which have NOT CHANGED    Details   melatonin 3 mg Take 2 tablets (6 mg  total) by mouth daily at bedtime, Starting Mon 12/2/2024, Normal      naltrexone (REVIA) 50 mg tablet Take 1 tablet (50 mg total) by mouth daily, Starting Mon 12/2/2024, Normal      sertraline (ZOLOFT) 100 mg tablet Take 1 tablet (100 mg total) by mouth daily, Starting Mon 12/2/2024, Normal      Thiamine Mononitrate (VITAMIN B1) 100 mg tablet Take 1 tablet (100 mg total) by mouth daily, Starting Mon 12/2/2024, Normal           No discharge procedures on file.  ED SEPSIS DOCUMENTATION   Time reflects when diagnosis was documented in both MDM as applicable and the Disposition within this note       Time User Action Codes Description Comment    1/5/2025  5:13 PM Linda Pinedo Add [Z32.00] Encounter for pregnancy test     1/5/2025  5:13 PM Linda Pinedo Add [Z32.02] Negative pregnancy test                  Linda Pinedo PA-C  01/05/25 4816

## 2025-01-05 NOTE — DISCHARGE INSTRUCTIONS
DISCHARGE INSTRUCTIONS:    FOLLOW UP WITH YOUR PRIMARY CARE PROVIDER OR THE [facility]. MAKE AN APPOINTMENT TO BE SEEN.    FOLLOW UP WITH OBGYN.    IF SYMPTOMS WORSEN OR NEW SYMPTOMS ARISE, RETURN TO THE ER TO BE SEEN.